# Patient Record
Sex: FEMALE | Race: WHITE | NOT HISPANIC OR LATINO
[De-identification: names, ages, dates, MRNs, and addresses within clinical notes are randomized per-mention and may not be internally consistent; named-entity substitution may affect disease eponyms.]

---

## 2021-02-02 ENCOUNTER — TRANSCRIPTION ENCOUNTER (OUTPATIENT)
Age: 66
End: 2021-02-02

## 2021-04-12 ENCOUNTER — OUTPATIENT (OUTPATIENT)
Dept: OUTPATIENT SERVICES | Facility: HOSPITAL | Age: 66
LOS: 1 days | End: 2021-04-12
Payer: MEDICARE

## 2021-04-12 DIAGNOSIS — R68.81 EARLY SATIETY: ICD-10-CM

## 2021-04-12 PROCEDURE — 74240 X-RAY XM UPR GI TRC 1CNTRST: CPT

## 2021-04-12 PROCEDURE — 74240 X-RAY XM UPR GI TRC 1CNTRST: CPT | Mod: 26

## 2021-04-12 PROCEDURE — 74220 X-RAY XM ESOPHAGUS 1CNTRST: CPT

## 2021-04-15 PROBLEM — Z00.00 ENCOUNTER FOR PREVENTIVE HEALTH EXAMINATION: Status: ACTIVE | Noted: 2021-04-15

## 2021-04-16 ENCOUNTER — OUTPATIENT (OUTPATIENT)
Dept: OUTPATIENT SERVICES | Facility: HOSPITAL | Age: 66
LOS: 1 days | End: 2021-04-16
Payer: MEDICARE

## 2021-04-16 ENCOUNTER — APPOINTMENT (OUTPATIENT)
Dept: CT IMAGING | Facility: CLINIC | Age: 66
End: 2021-04-16
Payer: MEDICARE

## 2021-04-16 DIAGNOSIS — R68.81 EARLY SATIETY: ICD-10-CM

## 2021-04-16 DIAGNOSIS — R10.13 EPIGASTRIC PAIN: ICD-10-CM

## 2021-04-16 DIAGNOSIS — R14.0 ABDOMINAL DISTENSION (GASEOUS): ICD-10-CM

## 2021-04-16 PROCEDURE — 82565 ASSAY OF CREATININE: CPT

## 2021-04-16 PROCEDURE — 74177 CT ABD & PELVIS W/CONTRAST: CPT | Mod: 26,MH

## 2021-04-16 PROCEDURE — 74177 CT ABD & PELVIS W/CONTRAST: CPT

## 2021-04-20 ENCOUNTER — RESULT REVIEW (OUTPATIENT)
Age: 66
End: 2021-04-20

## 2021-04-22 ENCOUNTER — APPOINTMENT (OUTPATIENT)
Dept: ENDOCRINOLOGY | Facility: CLINIC | Age: 66
End: 2021-04-22
Payer: MEDICARE

## 2021-04-22 VITALS
RESPIRATION RATE: 16 BRPM | SYSTOLIC BLOOD PRESSURE: 127 MMHG | TEMPERATURE: 98.1 F | HEART RATE: 73 BPM | OXYGEN SATURATION: 97 % | WEIGHT: 190 LBS | HEIGHT: 62 IN | BODY MASS INDEX: 34.96 KG/M2 | DIASTOLIC BLOOD PRESSURE: 72 MMHG

## 2021-04-22 DIAGNOSIS — Z78.9 OTHER SPECIFIED HEALTH STATUS: ICD-10-CM

## 2021-04-22 PROCEDURE — 99214 OFFICE O/P EST MOD 30 MIN: CPT

## 2021-04-25 PROBLEM — Z78.9 CURRENT NON-SMOKER: Status: ACTIVE | Noted: 2021-04-25

## 2021-04-25 PROBLEM — Z78.9 CURRENT NON-DRINKER OF ALCOHOL: Status: ACTIVE | Noted: 2021-04-25

## 2021-04-25 RX ORDER — CYCLOBENZAPRINE HYDROCHLORIDE 10 MG/1
10 TABLET, FILM COATED ORAL
Qty: 30 | Refills: 0 | Status: DISCONTINUED | COMMUNITY
Start: 2021-01-03

## 2021-04-25 RX ORDER — MUPIROCIN 20 MG/G
2 OINTMENT TOPICAL
Qty: 22 | Refills: 0 | Status: DISCONTINUED | COMMUNITY
Start: 2021-03-05

## 2021-04-25 RX ORDER — OMEPRAZOLE 20 MG/1
20 CAPSULE, DELAYED RELEASE ORAL
Qty: 90 | Refills: 0 | Status: DISCONTINUED | COMMUNITY
Start: 2021-02-27

## 2021-04-25 RX ORDER — CLOBETASOL PROPIONATE 0.5 MG/G
0.05 CREAM TOPICAL
Qty: 15 | Refills: 0 | Status: DISCONTINUED | COMMUNITY
Start: 2020-08-20

## 2021-04-25 RX ORDER — CHLORHEXIDINE GLUCONATE, 0.12% ORAL RINSE 1.2 MG/ML
0.12 SOLUTION DENTAL
Qty: 473 | Refills: 0 | Status: DISCONTINUED | COMMUNITY
Start: 2021-03-03

## 2021-04-25 RX ORDER — IBUPROFEN 800 MG/1
800 TABLET, FILM COATED ORAL
Qty: 15 | Refills: 0 | Status: DISCONTINUED | COMMUNITY
Start: 2021-03-11

## 2021-04-25 RX ORDER — OMEPRAZOLE 40 MG/1
40 CAPSULE, DELAYED RELEASE ORAL
Qty: 60 | Refills: 0 | Status: ACTIVE | COMMUNITY
Start: 2021-04-20

## 2021-04-25 RX ORDER — AMOXICILLIN 500 MG/1
500 CAPSULE ORAL
Qty: 21 | Refills: 0 | Status: DISCONTINUED | COMMUNITY
Start: 2021-03-11

## 2021-04-25 NOTE — PHYSICAL EXAM
[Alert] : alert [No Acute Distress] : no acute distress [Normal Sclera/Conjunctiva] : normal sclera/conjunctiva [No Proptosis] : no proptosis [Normal Outer Ear/Nose] : the ears and nose were normal in appearance [Normal Hearing] : hearing was normal [No Respiratory Distress] : no respiratory distress [Clear to Auscultation] : lungs were clear to auscultation bilaterally [Normal Bowel Sounds] : normal bowel sounds [Soft] : abdomen soft [Normal Gait] : normal gait [No Joint Swelling] : no joint swelling seen [No Rash] : no rash [Cranial Nerves Intact] : cranial nerves 2-12 were intact [No Tremors] : no tremors [Oriented x3] : oriented to person, place, and time [Normal Affect] : the affect was normal [Normal Mood] : the mood was normal [Foot Ulcers] : no foot ulcers

## 2021-04-25 NOTE — REVIEW OF SYSTEMS
[All other systems negative] : All other systems negative [Fatigue] : no fatigue [Decreased Appetite] : appetite not decreased [Visual Field Defect] : no visual field defect [Dysphonia] : no dysphonia [Chest Pain] : no chest pain [Palpitations] : no palpitations [Shortness Of Breath] : no shortness of breath [Nausea] : no nausea [Vomiting] : no vomiting [Joint Pain] : no joint pain [Muscle Weakness] : no muscle weakness [Acne] : no acne [Headaches] : no headaches [Tremors] : no tremors [Depression] : no depression [Cold Intolerance] : no cold intolerance [Heat Intolerance] : no heat intolerance [Easy Bleeding] : no ~M tendency for easy bleeding [Easy Bruising] : no tendency for easy bruising

## 2021-04-25 NOTE — ASSESSMENT
[FreeTextEntry1] : 65 yr old F with HTN, hiatal hernia here with incidental finding of R adrenal mass\par R adrenal mass:\par Will refer for MRI adrenal glands to better characterize benign vs malignant characteristics of lesion\par Check Plasma Renin and Serum Aldosterone levels\par Check Plasma metanephrines and DHEAS\par Will perform 1 mg dexamethasone test (instructed pt on proper timings)\par \par Obesity:\par Counselled on diet and exercise modification and importance of weight loss\par \par \par

## 2021-05-06 LAB
ACTH SER-ACNC: <1.5 PG/ML
ALBUMIN SERPL ELPH-MCNC: 4.4 G/DL
ALDOSTERONE SERUM: 7 NG/DL
ALP BLD-CCNC: 111 U/L
ALT SERPL-CCNC: 36 U/L
ANION GAP SERPL CALC-SCNC: 13 MMOL/L
AST SERPL-CCNC: 20 U/L
BASOPHILS # BLD AUTO: 0.03 K/UL
BASOPHILS NFR BLD AUTO: 0.4 %
BILIRUB SERPL-MCNC: 0.6 MG/DL
BUN SERPL-MCNC: 16 MG/DL
CALCIUM SERPL-MCNC: 9.7 MG/DL
CHLORIDE SERPL-SCNC: 106 MMOL/L
CO2 SERPL-SCNC: 23 MMOL/L
CORTIS SERPL-MCNC: 2.3 UG/DL
CREAT SERPL-MCNC: 0.71 MG/DL
DEXAMETHASONE LEVEL: NORMAL
DHEA-SULFATE, SERUM: 25 UG/DL
EOSINOPHIL # BLD AUTO: 0.01 K/UL
EOSINOPHIL NFR BLD AUTO: 0.1 %
GLUCOSE SERPL-MCNC: 147 MG/DL
HCT VFR BLD CALC: 45.6 %
HGB BLD-MCNC: 14.6 G/DL
IMM GRANULOCYTES NFR BLD AUTO: 0.3 %
LYMPHOCYTES # BLD AUTO: 1.16 K/UL
LYMPHOCYTES NFR BLD AUTO: 16.2 %
MAN DIFF?: NORMAL
MCHC RBC-ENTMCNC: 27.4 PG
MCHC RBC-ENTMCNC: 32 GM/DL
MCV RBC AUTO: 85.7 FL
METANEPHRINE, PL: 25.4 PG/ML
MONOCYTES # BLD AUTO: 0.18 K/UL
MONOCYTES NFR BLD AUTO: 2.5 %
NEUTROPHILS # BLD AUTO: 5.75 K/UL
NEUTROPHILS NFR BLD AUTO: 80.5 %
NORMETANEPHRINE, PL: 102.9 PG/ML
PLATELET # BLD AUTO: 313 K/UL
POTASSIUM SERPL-SCNC: 4.2 MMOL/L
PROT SERPL-MCNC: 7.1 G/DL
RBC # BLD: 5.32 M/UL
RBC # FLD: 14.2 %
RENIN ACTIVITY, PLASMA: 1.92 NG/ML/HR
SODIUM SERPL-SCNC: 142 MMOL/L
T4 FREE SERPL-MCNC: 1.3 NG/DL
TESTOST BND SERPL-MCNC: 0.3 PG/ML
TESTOST SERPL-MCNC: 7.5 NG/DL
TSH SERPL-ACNC: 1.2 UIU/ML
WBC # FLD AUTO: 7.15 K/UL

## 2021-05-11 ENCOUNTER — APPOINTMENT (OUTPATIENT)
Dept: MRI IMAGING | Facility: CLINIC | Age: 66
End: 2021-05-11
Payer: MEDICARE

## 2021-05-11 ENCOUNTER — OUTPATIENT (OUTPATIENT)
Dept: OUTPATIENT SERVICES | Facility: HOSPITAL | Age: 66
LOS: 1 days | End: 2021-05-11
Payer: MEDICARE

## 2021-05-11 DIAGNOSIS — D35.00 BENIGN NEOPLASM OF UNSPECIFIED ADRENAL GLAND: ICD-10-CM

## 2021-05-11 PROCEDURE — A9585: CPT

## 2021-05-11 PROCEDURE — G1004: CPT

## 2021-05-11 PROCEDURE — 74183 MRI ABD W/O CNTR FLWD CNTR: CPT | Mod: 26,MF

## 2021-05-11 PROCEDURE — 74183 MRI ABD W/O CNTR FLWD CNTR: CPT

## 2021-05-24 ENCOUNTER — APPOINTMENT (OUTPATIENT)
Dept: UROLOGY | Facility: CLINIC | Age: 66
End: 2021-05-24
Payer: MEDICARE

## 2021-05-24 VITALS
HEIGHT: 62 IN | WEIGHT: 195 LBS | DIASTOLIC BLOOD PRESSURE: 83 MMHG | HEART RATE: 86 BPM | BODY MASS INDEX: 35.88 KG/M2 | TEMPERATURE: 97.2 F | RESPIRATION RATE: 16 BRPM | SYSTOLIC BLOOD PRESSURE: 131 MMHG

## 2021-05-24 DIAGNOSIS — Z80.1 FAMILY HISTORY OF MALIGNANT NEOPLASM OF TRACHEA, BRONCHUS AND LUNG: ICD-10-CM

## 2021-05-24 DIAGNOSIS — E27.8 OTHER SPECIFIED DISORDERS OF ADRENAL GLAND: ICD-10-CM

## 2021-05-24 DIAGNOSIS — Z80.0 FAMILY HISTORY OF MALIGNANT NEOPLASM OF DIGESTIVE ORGANS: ICD-10-CM

## 2021-05-24 DIAGNOSIS — Z80.3 FAMILY HISTORY OF MALIGNANT NEOPLASM OF BREAST: ICD-10-CM

## 2021-05-24 PROCEDURE — 99204 OFFICE O/P NEW MOD 45 MIN: CPT

## 2021-05-24 RX ORDER — SUCRALFATE 1 G/1
1 TABLET ORAL
Qty: 60 | Refills: 0 | Status: ACTIVE | COMMUNITY
Start: 2021-04-21

## 2021-05-24 NOTE — LETTER BODY
[Dear  ___] : Dear  [unfilled], [FreeTextEntry1] : I had the pleasure of seeing your patient, ERNST RAE, in the office today.  \par \par Please see my office note below.\par \par Thank you for allowing me to participate in his care and please do not hesitate to contact me with any questions or concerns regarding her care.\par \par Sincerely,\par \par Roldan Alfonso MD\par Chief of Urology, [facility]\par  of Urology\par [address] [place] [address], [place] [address]\[place] [address]\par PH:      [phone]\par Email:  [email or web address]

## 2021-05-24 NOTE — HISTORY OF PRESENT ILLNESS
[FreeTextEntry1] : The patient was referred for evaluation of right adrenal mass.\par She initially presented with upper GI symptoms and was started on treatment for GERD/gastritis.  During evaluation she underwent imaging which revealed incidental finding of a right adrenal lesion.  She was seen by endocrinology and underwent a full work-up which revealed subclinical hypercortisolism.\par \par CT abdomen/pelvis: Images reviewed, findings revealed 3.9 x 3.7 cm atypical adrenal mass with eccentric calcification.  Follow-up MRI abdomen showed findings consistent with likely atypical adenoma.\par \par She denies a history of palpitations, diaphoresis, headaches, episodic hypertension, hypokalemia.  She denies easy bruising, mood changes, proximal muscle weakness.  Her GI symptoms have resolved with improved medication including omeprazole and sucralfate.\par \par

## 2021-05-24 NOTE — ASSESSMENT
[FreeTextEntry1] : CT/MRI imaging reviewed.  Adrenal work-up from endocrinology was reviewed.  Agree with diagnosis of likely benign atypical adrenal adenoma with subclinical hypersecretion of cortisol.\par \par Recommend to proceed with minimally invasive laparoscopic right adrenalectomy.  Reviewed operative procedure, hospital stay and recovery time.  Risks of surgery discussed including risks of bleeding (both immediate and delayed), wound/abdominal infection, adjacent organ injury (bowel/vascular), conversion to open surgery,  DVT/PE, and anesthetic complications.  Discussed potential changes in hormonal function post adrenalectomy.  Discussed the likelihood of both malignant and benign pathology.  \par \par All questions answered.  Patient was seen by another urologist who recommended a robotic right adrenalectomy.  Patient will consider both recommendations and will call the office at the end of the week with final decision.\par

## 2021-05-29 ENCOUNTER — TRANSCRIPTION ENCOUNTER (OUTPATIENT)
Age: 66
End: 2021-05-29

## 2021-06-01 LAB
CORTIS 24H UR-MCNC: 24 H
CORTIS 24H UR-MRATE: 49 MCG/24 H
CORTIS SAL-MCNC: NORMAL
SPECIMEN VOL 24H UR: 1575 ML

## 2021-06-02 ENCOUNTER — NON-APPOINTMENT (OUTPATIENT)
Age: 66
End: 2021-06-02

## 2021-06-11 ENCOUNTER — OUTPATIENT (OUTPATIENT)
Dept: OUTPATIENT SERVICES | Facility: HOSPITAL | Age: 66
LOS: 1 days | End: 2021-06-11
Payer: MEDICARE

## 2021-06-11 VITALS
HEIGHT: 62 IN | RESPIRATION RATE: 18 BRPM | OXYGEN SATURATION: 97 % | WEIGHT: 203.93 LBS | TEMPERATURE: 98 F | DIASTOLIC BLOOD PRESSURE: 80 MMHG | SYSTOLIC BLOOD PRESSURE: 130 MMHG | HEART RATE: 78 BPM

## 2021-06-11 DIAGNOSIS — E27.8 OTHER SPECIFIED DISORDERS OF ADRENAL GLAND: ICD-10-CM

## 2021-06-11 DIAGNOSIS — R06.83 SNORING: ICD-10-CM

## 2021-06-11 DIAGNOSIS — Z98.890 OTHER SPECIFIED POSTPROCEDURAL STATES: Chronic | ICD-10-CM

## 2021-06-11 DIAGNOSIS — I10 ESSENTIAL (PRIMARY) HYPERTENSION: ICD-10-CM

## 2021-06-11 DIAGNOSIS — Z98.49 CATARACT EXTRACTION STATUS, UNSPECIFIED EYE: Chronic | ICD-10-CM

## 2021-06-11 DIAGNOSIS — Z90.49 ACQUIRED ABSENCE OF OTHER SPECIFIED PARTS OF DIGESTIVE TRACT: Chronic | ICD-10-CM

## 2021-06-11 LAB
BLD GP AB SCN SERPL QL: NEGATIVE — SIGNIFICANT CHANGE UP
RH IG SCN BLD-IMP: POSITIVE — SIGNIFICANT CHANGE UP

## 2021-06-11 PROCEDURE — 93010 ELECTROCARDIOGRAM REPORT: CPT

## 2021-06-11 RX ORDER — SODIUM CHLORIDE 9 MG/ML
3 INJECTION INTRAMUSCULAR; INTRAVENOUS; SUBCUTANEOUS EVERY 8 HOURS
Refills: 0 | Status: DISCONTINUED | OUTPATIENT
Start: 2021-06-18 | End: 2021-06-21

## 2021-06-11 RX ORDER — SODIUM CHLORIDE 9 MG/ML
1000 INJECTION, SOLUTION INTRAVENOUS
Refills: 0 | Status: DISCONTINUED | OUTPATIENT
Start: 2021-06-18 | End: 2021-06-18

## 2021-06-11 NOTE — H&P PST ADULT - NSICDXFAMILYHX_GEN_ALL_CORE_FT
FAMILY HISTORY:  Mother  Still living? Unknown  FH: breast cancer, Age at diagnosis: Age Unknown  FH: colon cancer, Age at diagnosis: Age Unknown  FH: lung cancer, Age at diagnosis: Age Unknown  FH: ovarian cancer, Age at diagnosis: Age Unknown    Sibling  Still living? Unknown  FH: breast cancer, Age at diagnosis: Age Unknown

## 2021-06-11 NOTE — H&P PST ADULT - NSICDXPASTSURGICALHX_GEN_ALL_CORE_FT
PAST SURGICAL HISTORY:  H/O breast biopsy     H/O excision of mass benign mass from forehead    S/P anal fissurectomy     S/P cataract surgery     S/P cholecystectomy

## 2021-06-11 NOTE — H&P PST ADULT - NSICDXPROBLEM_GEN_ALL_CORE_FT
PROBLEM DIAGNOSES  Problem: Other specified disorders of adrenal gland  Assessment and Plan: Pt scheduled for surgery on 6/25/21.  Pre-op instructions provided. Pt verbalized understanding.   Pt to take own medication for GI ppx.   Pt given detailed verbal and written instructions on chlorhexidine wash. Pt verbalized understanding with teachback.  Endocrine recommendations in chart.   Pt is going for medical clearance per surgeon's request - requesting copy (due to age/surgery)    Problem: Snoring  Assessment and Plan: POLLY precautions. Pt with >3 criteria on STOP-Bang Questionairre. OR booking notified. htn      Problem: HTN (hypertension)  Assessment and Plan: Pt instructed to take her Diltiazem the morning of surgery.

## 2021-06-11 NOTE — H&P PST ADULT - GIT GEN HX ROS MEA POS PC
occasional symptoms  - undergoing GI workup - going for endoscopy next week/nausea/diarrhea/abdominal pain

## 2021-06-11 NOTE — H&P PST ADULT - HISTORY OF PRESENT ILLNESS
66 year old female with finding of adrenal mass on workup for GI symptom a few weeks ago.  66 year old female with finding of adrenal mass on workup for GI symptoms done a few weeks ago. Pt presents today for presurgical evaluation for ... 66 year old female with finding of adrenal mass on workup for GI symptoms done a few weeks ago. Pt presents today for presurgical evaluation for Right Laparoscopic Adrenalectomy.

## 2021-06-11 NOTE — H&P PST ADULT - NEGATIVE ENMT SYMPTOMS
no ear pain/no tinnitus/no vertigo/no sinus symptoms/no nasal congestion/no throat pain/no dysphagia

## 2021-06-11 NOTE — H&P PST ADULT - NSANTHTOTALSCORECAL_ENT_A_CORE
Spoke with patient today in regard to smoking cessation progress for 3/6 month telephone follow up, he states not tobacco free. Patient has scheduled an appointment to return to the program for Quit attempt #4 for support on 7/24/2020 @ 10:00 am via telephone due to COVID-19 pandemic. Informed patient of benefit period,future follow ups, and contact information if any further help or support is needed. Will resolve episode and complete smart form for Quit attempt #2 and #3.      
6

## 2021-06-17 ENCOUNTER — TRANSCRIPTION ENCOUNTER (OUTPATIENT)
Age: 66
End: 2021-06-17

## 2021-06-17 NOTE — ASU PATIENT PROFILE, ADULT - PSH
H/O breast biopsy    H/O excision of mass  benign mass from forehead  S/P anal fissurectomy    S/P cataract surgery    S/P cholecystectomy

## 2021-06-18 ENCOUNTER — RESULT REVIEW (OUTPATIENT)
Age: 66
End: 2021-06-18

## 2021-06-18 ENCOUNTER — APPOINTMENT (OUTPATIENT)
Dept: UROLOGY | Facility: HOSPITAL | Age: 66
End: 2021-06-18

## 2021-06-18 ENCOUNTER — INPATIENT (INPATIENT)
Facility: HOSPITAL | Age: 66
LOS: 2 days | Discharge: HOME | End: 2021-06-21
Attending: UROLOGY | Admitting: UROLOGY
Payer: MEDICARE

## 2021-06-18 VITALS
DIASTOLIC BLOOD PRESSURE: 74 MMHG | RESPIRATION RATE: 18 BRPM | WEIGHT: 199.96 LBS | HEIGHT: 62 IN | TEMPERATURE: 98 F | OXYGEN SATURATION: 95 % | HEART RATE: 88 BPM | SYSTOLIC BLOOD PRESSURE: 138 MMHG

## 2021-06-18 DIAGNOSIS — E66.9 OBESITY, UNSPECIFIED: ICD-10-CM

## 2021-06-18 DIAGNOSIS — Z90.49 ACQUIRED ABSENCE OF OTHER SPECIFIED PARTS OF DIGESTIVE TRACT: Chronic | ICD-10-CM

## 2021-06-18 DIAGNOSIS — I10 ESSENTIAL (PRIMARY) HYPERTENSION: ICD-10-CM

## 2021-06-18 DIAGNOSIS — E27.8 OTHER SPECIFIED DISORDERS OF ADRENAL GLAND: ICD-10-CM

## 2021-06-18 DIAGNOSIS — Z98.890 OTHER SPECIFIED POSTPROCEDURAL STATES: Chronic | ICD-10-CM

## 2021-06-18 DIAGNOSIS — Z98.49 CATARACT EXTRACTION STATUS, UNSPECIFIED EYE: Chronic | ICD-10-CM

## 2021-06-18 DIAGNOSIS — E24.0 PITUITARY-DEPENDENT CUSHING'S DISEASE: ICD-10-CM

## 2021-06-18 PROCEDURE — 88307 TISSUE EXAM BY PATHOLOGIST: CPT | Mod: 26

## 2021-06-18 PROCEDURE — 60650 LAPAROSCOPY ADRENALECTOMY: CPT | Mod: RT

## 2021-06-18 PROCEDURE — 88360 TUMOR IMMUNOHISTOCHEM/MANUAL: CPT | Mod: 26

## 2021-06-18 RX ORDER — SODIUM CHLORIDE 9 MG/ML
1000 INJECTION, SOLUTION INTRAVENOUS
Refills: 0 | Status: DISCONTINUED | OUTPATIENT
Start: 2021-06-18 | End: 2021-06-18

## 2021-06-18 RX ORDER — POLYETHYLENE GLYCOL 3350 17 G/17G
17 POWDER, FOR SOLUTION ORAL DAILY
Refills: 0 | Status: DISCONTINUED | OUTPATIENT
Start: 2021-06-18 | End: 2021-06-21

## 2021-06-18 RX ORDER — HYDROCORTISONE 20 MG
50 TABLET ORAL EVERY 8 HOURS
Refills: 0 | Status: DISCONTINUED | OUTPATIENT
Start: 2021-06-18 | End: 2021-06-19

## 2021-06-18 RX ORDER — ONDANSETRON 8 MG/1
4 TABLET, FILM COATED ORAL ONCE
Refills: 0 | Status: COMPLETED | OUTPATIENT
Start: 2021-06-18 | End: 2021-06-18

## 2021-06-18 RX ORDER — KETOROLAC TROMETHAMINE 30 MG/ML
15 SYRINGE (ML) INJECTION EVERY 6 HOURS
Refills: 0 | Status: DISCONTINUED | OUTPATIENT
Start: 2021-06-18 | End: 2021-06-19

## 2021-06-18 RX ORDER — OXYCODONE HYDROCHLORIDE 5 MG/1
5 TABLET ORAL EVERY 6 HOURS
Refills: 0 | Status: DISCONTINUED | OUTPATIENT
Start: 2021-06-18 | End: 2021-06-21

## 2021-06-18 RX ORDER — HEPARIN SODIUM 5000 [USP'U]/ML
5000 INJECTION INTRAVENOUS; SUBCUTANEOUS EVERY 8 HOURS
Refills: 0 | Status: DISCONTINUED | OUTPATIENT
Start: 2021-06-18 | End: 2021-06-21

## 2021-06-18 RX ORDER — HYDROMORPHONE HYDROCHLORIDE 2 MG/ML
0.5 INJECTION INTRAMUSCULAR; INTRAVENOUS; SUBCUTANEOUS
Refills: 0 | Status: DISCONTINUED | OUTPATIENT
Start: 2021-06-18 | End: 2021-06-18

## 2021-06-18 RX ORDER — PANTOPRAZOLE SODIUM 20 MG/1
40 TABLET, DELAYED RELEASE ORAL
Refills: 0 | Status: DISCONTINUED | OUTPATIENT
Start: 2021-06-18 | End: 2021-06-21

## 2021-06-18 RX ORDER — OXYCODONE HYDROCHLORIDE 5 MG/1
10 TABLET ORAL EVERY 6 HOURS
Refills: 0 | Status: DISCONTINUED | OUTPATIENT
Start: 2021-06-18 | End: 2021-06-21

## 2021-06-18 RX ORDER — ONDANSETRON 8 MG/1
4 TABLET, FILM COATED ORAL EVERY 6 HOURS
Refills: 0 | Status: DISCONTINUED | OUTPATIENT
Start: 2021-06-18 | End: 2021-06-21

## 2021-06-18 RX ORDER — SENNA PLUS 8.6 MG/1
2 TABLET ORAL AT BEDTIME
Refills: 0 | Status: DISCONTINUED | OUTPATIENT
Start: 2021-06-18 | End: 2021-06-21

## 2021-06-18 RX ORDER — HYDROMORPHONE HYDROCHLORIDE 2 MG/ML
1 INJECTION INTRAMUSCULAR; INTRAVENOUS; SUBCUTANEOUS
Refills: 0 | Status: DISCONTINUED | OUTPATIENT
Start: 2021-06-18 | End: 2021-06-18

## 2021-06-18 RX ORDER — ACETAMINOPHEN 500 MG
1000 TABLET ORAL EVERY 6 HOURS
Refills: 0 | Status: COMPLETED | OUTPATIENT
Start: 2021-06-18 | End: 2021-06-19

## 2021-06-18 RX ADMIN — Medication 400 MILLIGRAM(S): at 21:33

## 2021-06-18 RX ADMIN — HEPARIN SODIUM 5000 UNIT(S): 5000 INJECTION INTRAVENOUS; SUBCUTANEOUS at 21:56

## 2021-06-18 RX ADMIN — Medication 15 MILLIGRAM(S): at 18:28

## 2021-06-18 RX ADMIN — SODIUM CHLORIDE 3 MILLILITER(S): 9 INJECTION INTRAMUSCULAR; INTRAVENOUS; SUBCUTANEOUS at 21:57

## 2021-06-18 RX ADMIN — Medication 15 MILLIGRAM(S): at 18:45

## 2021-06-18 RX ADMIN — ONDANSETRON 4 MILLIGRAM(S): 8 TABLET, FILM COATED ORAL at 18:28

## 2021-06-18 RX ADMIN — Medication 50 MILLIGRAM(S): at 21:57

## 2021-06-18 RX ADMIN — Medication 15 MILLIGRAM(S): at 23:37

## 2021-06-18 NOTE — CONSULT NOTE ADULT - ASSESSMENT
67 yo female with PMhx of HTN and Cushing's and R adrenal Mass who presented for scheduled R adrenalectomy.    Problem 1: Cushing's s/p R adrenalectomy  - Start 50mg IV hydrocortisone q8hrs. Will taper course while patient is in the hospital pending BP trend.   - Monitor BP  - Check HbA1c  - Patient should f/u with Outpatient Endocrinologist Dr. Quintana.     Problem 2: R adrenal Mass s/p R adrenalectomy  - Recommendations as noted above  - F/u pathology    Problem 3: Hypertension  - Hold home BP medication at this time    Problem 4: Obesity  - Encourage lifestyle modifications  - Check HbA1c as noted above    Discussed with Primary Team    Kesha Lucia M.D  Endocrine Fellow  Pager #[phone] 65 yo female with PMhx of HTN and Cushing's and R adrenal Mass who presented for scheduled R adrenalectomy.    Problem 1: Cushing's s/p R adrenalectomy  Elevated outpatient serum cortisol 2.3 after dex suppression test as well as elevated urine and salivary cortisol. ACTH levels <1.5.   - Start 50mg IV hydrocortisone q8hrs. Will taper course while patient is in the hospital pending BP trend.   - Monitor BP  - Check HbA1c  - Patient should f/u with Outpatient Endocrinologist Dr. Quintana.     Problem 2: R adrenal Mass s/p R adrenalectomy  MRI completed in 05/2021 which showed a 4.0 X 3.6cm R adrenal lesion with atypical features.   - Recommendations as noted above  - F/u pathology    Problem 3: Hypertension  - Hold home BP medication at this time    Problem 4: Obesity  - Encourage lifestyle modifications  - Check HbA1c as noted above    Discussed with Primary Team    Kesha Lucia M.D  Endocrine Fellow  Pager #[phone]

## 2021-06-18 NOTE — CONSULT NOTE ADULT - SUBJECTIVE AND OBJECTIVE BOX
HPI:  67 yo female with PMhx of HTN and Cushing's and R adrenal Mass who presented for scheduled R adrenalectomy.    Endocrine History:  Patient was initially referred to Endocrinologist Dr. Quintana in 04/2021 after she was noted to have R adrenal mass on CT scan measuring 3.9 X 3.7 cm.  Patient subsequently had an MRI completed in 05/2021 which showed a 4.0 X 3.6cm R adrenal lesion with atypical features.   Hormonal workup revealed elevated serum cortisol 2.3 after dex suppression test as well as elevated urine and salivary cortisol. ACTH levels were low <1.5.   Serum renin, aldosterone, and metanephrine were wnl.     Patient has a h/o HTN well controlled on one agent.  No h/o episodic headaches, diaphoresis. Patient dis report intermittent palpitations.  No h/o easy bruising, skin changes, proximal muscle weakness, mood changes, hair growth.  Patient noted to have weight gain     Patient was subsequently referred to urology who recommended R adrenalectomy.     Interval History:  Patient seen prior to surgery today. ROS as noted below.  Per primary team patient received 100mg IV hydrocortisone intra-op.    PAST MEDICAL & SURGICAL HISTORY:  HTN (hypertension)  Obesity  S/P cholecystectomy  S/P cataract surgery  H/O excision of mass  benign mass from forehead  H/O breast biopsy  S/P anal fissurectomy    FAMILY HISTORY:  FH: breast cancer (Sibling, Mother)  FH: ovarian cancer (Mother)  FH: colon cancer (Mother)  FH: lung cancer (Mother)    Social History:  No tobacco, EtOH, or illicit drug use    Outpatient Medications:  dilTIAZem 120 mg/24 hours oral capsule, extended release: 1 cap(s) orally 2 times a day  Fish Oil oral capsule: 1 cap(s) orally once a day - last dose 6/17/21  Multivitamin: 1 tab(s) orally once a day - last dose 6/17/21  omeprazole 40 mg oral delayed release capsule: 1 cap(s) orally once a day  Osteo Bi-Flex 250 mg-200 mg oral tablet: 1 tab(s) orally once a day, last dose 6/17/2021  Vitamin C 500 mg oral capsule: 2 cap(s) orally once a day  Vitamin D3 1000 intl units (25 mcg) oral tablet: 1 tab(s) orally once a day  Zinc 140 mg (as elemental zinc 50 mg) oral tablet: 1 tab(s) orally once a day    MEDICATIONS  (STANDING):  lactated ringers. 1000 milliLiter(s) (125 mL/Hr) IV Continuous <Continuous>  sodium chloride 0.9% lock flush 3 milliLiter(s) IV Push every 8 hours    MEDICATIONS  (PRN):  HYDROmorphone  Injectable 0.5 milliGRAM(s) IV Push every 10 minutes PRN Moderate Pain (4 - 6)  HYDROmorphone  Injectable 1 milliGRAM(s) IV Push every 10 minutes PRN Severe Pain (7 - 10)  ondansetron Injectable 4 milliGRAM(s) IV Push once PRN Nausea and/or Vomiting    Allergies  No Known Allergies    Review of Systems:  General: no fevers, chills, poor appetite, + weight gain  HEENT: no blurry vision, no headache  CV: no chest pain, no current palpitations  Pulm: no SOB, no cough  GI: no n/v/d, no abdominal pain  : no dysuria, no hematuria  Skin: no rash or ulcers  Endocrine: no polyuria, polydipsia  Neuro: no tremors  Psych: no depressed mood    Physical exam  VITALS: T(C): 36.5 (06-18-21 @ 17:35)  T(F): 97.7 (06-18-21 @ 17:35), Max: 98 (06-18-21 @ 10:59)  HR: 74 (06-18-21 @ 17:40) (72 - 88)  BP: 136/72 (06-18-21 @ 17:40) (136/72 - 141/67)  RR:  (16 - 18)  SpO2:  (95% - 97%)  Wt(kg): 90.7  General: NAD, well-appearing  Eyes: no proptosis, anicteric  HEENT: atraumatic, normocephalic, MMM  Thyroid: normal size, no palpable nodules  CV: normal rate, regular rhythm  Pulm: CTA in anterior lung fields  Abd: +BS, soft and non-tender to palpation  Ext: 2+ radial and dorsalis pedis pulse, no edema  Skin: no rash, ulcers  Neuro: A&Ox3, no gross deficits appreciated  Psych: reactive affect, euthymic mood                                         HPI:  65 yo female with PMhx of HTN and Cushing's and R adrenal Mass who presented for scheduled R adrenalectomy.    Endocrine History:  Patient was initially referred to Endocrinologist Dr. Quintana in 04/2021 after she was noted to have R adrenal mass on CT scan measuring 3.9 X 3.7 cm.  Patient subsequently had an MRI completed in 05/2021 which showed a 4.0 X 3.6cm R adrenal lesion with atypical features.   Hormonal workup revealed elevated serum cortisol 2.3 after dex suppression test as well as elevated urine and salivary cortisol. ACTH levels were low <1.5.   Serum renin, aldosterone, and metanephrine were wnl.     Patient has a h/o HTN well controlled on one agent.  No h/o episodic headaches, diaphoresis. Patient dis report intermittent palpitations.  No h/o easy bruising, skin changes, proximal muscle weakness, mood changes, hair growth.  Patient noted to have weight gain     Patient was subsequently referred to urology who recommended R adrenalectomy.     Interval History:  Patient seen prior to surgery today. ROS as noted below.  Per primary team patient received 100mg IV hydrocortisone intra-op.    PAST MEDICAL & SURGICAL HISTORY:  HTN (hypertension)  Obesity  S/P cholecystectomy  S/P cataract surgery  H/O excision of mass  benign mass from forehead  H/O breast biopsy  S/P anal fissurectomy    FAMILY HISTORY:  FH: breast cancer (Sibling, Mother)  FH: ovarian cancer (Mother)  FH: colon cancer (Mother)  FH: lung cancer (Mother)    Social History:  No tobacco, EtOH, or illicit drug use    Outpatient Medications:  dilTIAZem 120 mg/24 hours oral capsule, extended release: 1 cap(s) orally 2 times a day  Fish Oil oral capsule: 1 cap(s) orally once a day - last dose 6/17/21  Multivitamin: 1 tab(s) orally once a day - last dose 6/17/21  omeprazole 40 mg oral delayed release capsule: 1 cap(s) orally once a day  Osteo Bi-Flex 250 mg-200 mg oral tablet: 1 tab(s) orally once a day, last dose 6/17/2021  Vitamin C 500 mg oral capsule: 2 cap(s) orally once a day  Vitamin D3 1000 intl units (25 mcg) oral tablet: 1 tab(s) orally once a day  Zinc 140 mg (as elemental zinc 50 mg) oral tablet: 1 tab(s) orally once a day    MEDICATIONS  (STANDING):  lactated ringers. 1000 milliLiter(s) (125 mL/Hr) IV Continuous <Continuous>  sodium chloride 0.9% lock flush 3 milliLiter(s) IV Push every 8 hours    MEDICATIONS  (PRN):  HYDROmorphone  Injectable 0.5 milliGRAM(s) IV Push every 10 minutes PRN Moderate Pain (4 - 6)  HYDROmorphone  Injectable 1 milliGRAM(s) IV Push every 10 minutes PRN Severe Pain (7 - 10)  ondansetron Injectable 4 milliGRAM(s) IV Push once PRN Nausea and/or Vomiting    Allergies  No Known Allergies    Review of Systems:  General: no fevers, chills, poor appetite, + weight gain  HEENT: no blurry vision, no headache  CV: no chest pain, no current palpitations  Pulm: no SOB, no cough  GI: no n/v/d, no abdominal pain  : no dysuria, no hematuria  Skin: no rash or ulcers  Endocrine: no polyuria, polydipsia  Neuro: no tremors  Psych: no depressed mood    Physical exam  VITALS: T(C): 36.5 (06-18-21 @ 17:35)  T(F): 97.7 (06-18-21 @ 17:35), Max: 98 (06-18-21 @ 10:59)  HR: 74 (06-18-21 @ 17:40) (72 - 88)  BP: 136/72 (06-18-21 @ 17:40) (136/72 - 141/67)  RR:  (16 - 18)  SpO2:  (95% - 97%)  Wt(kg): 90.7  General: NAD, well-appearing  Eyes: no proptosis, anicteric  HEENT: atraumatic, normocephalic, MMM  Thyroid: normal size, no palpable nodules  CV: normal rate, regular rhythm, trace pitting edema   Pulm: CTA in anterior lung fields, no wheezes, rhonchi  Abd: +BS, soft and non-tender to palpation  Ext: warm, dry, 2+ radial pulse  Skin: no rash, ulcers  Neuro: A&Ox3, no gross deficits appreciated  Psych: reactive affect, euthymic mood                                         rehabilitation facility

## 2021-06-18 NOTE — ASU PREOP CHECKLIST - COMMENTS
patient took diltiazem, pepcid with sip of water this 7:30am patient took diltiazem, omerprazole with sip of water this 7:30am

## 2021-06-18 NOTE — CONSULT NOTE ADULT - PROBLEM SELECTOR PROBLEM 4
Class 2 obesity with body mass index (BMI) of 36.0 to 36.9 in adult, unspecified obesity type, unspecified whether serious comorbidity present

## 2021-06-18 NOTE — PATIENT PROFILE ADULT - DO YOU FEEL UNSAFE AT HOME, WORK, OR SCHOOL?
07/01/2020  Kandis Sosa is a 16 y.o., female.    Anesthesia Evaluation    I have reviewed the Patient Summary Reports.    I have reviewed the Nursing Notes.    I have reviewed the Medications.     Review of Systems  Anesthesia Hx:  No problems with previous Anesthesia    Social:  Non-Smoker    Cardiovascular:  Cardiovascular Normal     Pulmonary:  Pulmonary Normal    Renal/:  Renal/ Normal     Hepatic/GI:   GERD, well controlled Dyschezia  Abdominal Pain  Lactase Deficiency  Abdominal Pain  Constipation   Neurological:  Neurology Normal    Endocrine:  Endocrine Normal        Physical Exam  General:  Well nourished    Airway/Jaw/Neck:  Airway Findings: Mouth Opening: Normal Tongue: Normal  General Airway Assessment: Adult  Oropharynx Findings:  Mallampati: II  Jaw/Neck Findings:  Neck ROM: Normal ROM     Eyes/Ears/Nose:  Eyes/Ears/Nose Findings:    Dental:  Dental Findings:   Chest/Lungs:  Chest/Lungs Findings: Normal Respiratory Rate     Heart/Vascular:  Heart Findings: Rate: Normal  Rhythm: Regular Rhythm        Mental Status:  Mental Status Findings:  Cooperative, Alert and Oriented         Anesthesia Plan  Type of Anesthesia, risks & benefits discussed:  Anesthesia Type:  general  Patient's Preference:   Intra-op Monitoring Plan: standard ASA monitors  Intra-op Monitoring Plan Comments:   Post Op Pain Control Plan: multimodal analgesia  Post Op Pain Control Plan Comments:   Induction:   IV  Beta Blocker:  Patient is not currently on a Beta-Blocker (No further documentation required).       Informed Consent: Patient understands risks and agrees with Anesthesia plan.  Questions answered. Anesthesia consent signed with patient.  ASA Score: 2     Day of Surgery Review of History & Physical:            Ready For Surgery From Anesthesia Perspective.       
no

## 2021-06-18 NOTE — CONSULT NOTE ADULT - ATTENDING COMMENTS
Case discussed with Dr. Lucia on 6/18/2021.  Patient seen and evaluated 6/19/2021.  66F with 4cm R adrenal mass, biochemical diagnosis of Cushings syndrome with some symptoms now s/p R adrenalectomy yesterday.  Patient was initiated on stress dose hydrocortisone 50mg IV q8h post op.  She reports significant pain and only starting to tolerate liquids.  BP is stable off home antihypertensive.  Can decrease hydrocortisone to 50mg IV q12h today.  Check BMP for today as Na, K can fluctuate postop.  Counselled on discharge plan which will require hydrocortisone replacement at home.  Reviewed sick day management.  Patient does not appear ready for discharge today will follow up tomorrow.  Outpatient endocrine follow up with Dr. Quintana for steroid taper. Follow up surgical pathology.  Complex patient high level decision making.    Adia Conroy MD  Division of Endocrinology  Pager: [phone]    If after 6PM or before 9AM, or on weekends/holidays, please call endocrine answering service for assistance ([phone]).  For nonurgent matters email [email or web address] for assistance.

## 2021-06-18 NOTE — PROGRESS NOTE ADULT - PROBLEM SELECTOR PLAN 1
Strict I&O's  Analgesia Antiemetics  DVT prophylaxis  Incentive spirometry  hydrocortisone as per Endo consult  Clears / OOB  AM labs

## 2021-06-18 NOTE — PROGRESS NOTE ADULT - SUBJECTIVE AND OBJECTIVE BOX
Note    Post op Check    s/p : R lap adrenalectomy    Pt seen / examined c/o R sided mickey incisional  pain and nausea    Vital Signs Last 24 Hrs  T(C): 36.5 (18 Jun 2021 21:09), Max: 36.7 (18 Jun 2021 10:59)  T(F): 97.7 (18 Jun 2021 21:09), Max: 98 (18 Jun 2021 10:59)  HR: 85 (18 Jun 2021 21:09) (68 - 88)  BP: 112/54 (18 Jun 2021 21:09) (112/54 - 145/74)  BP(mean): 84 (18 Jun 2021 19:15) (84 - 92)  RR: 14 (18 Jun 2021 21:09) (13 - 18)  SpO2: 96% (18 Jun 2021 21:09) (92% - 98%)    I&O's Summary    18 Jun 2021 07:01  -  18 Jun 2021 22:01  --------------------------------------------------------  IN: 250 mL / OUT: 175 mL / NET: 75 mL    EBL=200cc  Fol=275cc      PHYSICAL EXAM:       Constitutional: awake alert NAD    Respiratory: no resp distress    Cardiovascular: RR    Gastrointestinal: Softly distended, trocar sites CDI, appropriately tender    Genitourinary: lopez in place; draining well; dark yellow    Extremities: + venodynes

## 2021-06-19 LAB
A1C WITH ESTIMATED AVERAGE GLUCOSE RESULT: 5.5 % — SIGNIFICANT CHANGE UP (ref 4–5.6)
ANION GAP SERPL CALC-SCNC: 14 MMOL/L — SIGNIFICANT CHANGE UP (ref 7–14)
BUN SERPL-MCNC: 17 MG/DL — SIGNIFICANT CHANGE UP (ref 7–23)
CALCIUM SERPL-MCNC: 8.3 MG/DL — LOW (ref 8.4–10.5)
CHLORIDE SERPL-SCNC: 101 MMOL/L — SIGNIFICANT CHANGE UP (ref 98–107)
CO2 SERPL-SCNC: 21 MMOL/L — LOW (ref 22–31)
COVID-19 SPIKE DOMAIN AB INTERP: POSITIVE
COVID-19 SPIKE DOMAIN ANTIBODY RESULT: >250 U/ML — HIGH
CREAT SERPL-MCNC: 0.78 MG/DL — SIGNIFICANT CHANGE UP (ref 0.5–1.3)
ESTIMATED AVERAGE GLUCOSE: 111 MG/DL — SIGNIFICANT CHANGE UP (ref 68–114)
GLUCOSE SERPL-MCNC: 154 MG/DL — HIGH (ref 70–99)
POTASSIUM SERPL-MCNC: 4.1 MMOL/L — SIGNIFICANT CHANGE UP (ref 3.5–5.3)
POTASSIUM SERPL-SCNC: 4.1 MMOL/L — SIGNIFICANT CHANGE UP (ref 3.5–5.3)
SARS-COV-2 IGG+IGM SERPL QL IA: >250 U/ML — HIGH
SARS-COV-2 IGG+IGM SERPL QL IA: POSITIVE
SODIUM SERPL-SCNC: 136 MMOL/L — SIGNIFICANT CHANGE UP (ref 135–145)

## 2021-06-19 PROCEDURE — 99223 1ST HOSP IP/OBS HIGH 75: CPT | Mod: GC

## 2021-06-19 PROCEDURE — 99222 1ST HOSP IP/OBS MODERATE 55: CPT

## 2021-06-19 RX ORDER — LIDOCAINE 4 G/100G
1 CREAM TOPICAL DAILY
Refills: 0 | Status: DISCONTINUED | OUTPATIENT
Start: 2021-06-19 | End: 2021-06-21

## 2021-06-19 RX ORDER — SODIUM CHLORIDE 9 MG/ML
1000 INJECTION, SOLUTION INTRAVENOUS
Refills: 0 | Status: DISCONTINUED | OUTPATIENT
Start: 2021-06-19 | End: 2021-06-20

## 2021-06-19 RX ORDER — HYDROCORTISONE 20 MG
50 TABLET ORAL EVERY 12 HOURS
Refills: 0 | Status: DISCONTINUED | OUTPATIENT
Start: 2021-06-19 | End: 2021-06-20

## 2021-06-19 RX ADMIN — SODIUM CHLORIDE 125 MILLILITER(S): 9 INJECTION, SOLUTION INTRAVENOUS at 13:09

## 2021-06-19 RX ADMIN — SODIUM CHLORIDE 75 MILLILITER(S): 9 INJECTION, SOLUTION INTRAVENOUS at 08:45

## 2021-06-19 RX ADMIN — Medication 50 MILLIGRAM(S): at 07:01

## 2021-06-19 RX ADMIN — Medication 400 MILLIGRAM(S): at 04:12

## 2021-06-19 RX ADMIN — SODIUM CHLORIDE 3 MILLILITER(S): 9 INJECTION INTRAMUSCULAR; INTRAVENOUS; SUBCUTANEOUS at 05:28

## 2021-06-19 RX ADMIN — Medication 400 MILLIGRAM(S): at 17:37

## 2021-06-19 RX ADMIN — Medication 400 MILLIGRAM(S): at 09:59

## 2021-06-19 RX ADMIN — Medication 50 MILLIGRAM(S): at 17:37

## 2021-06-19 RX ADMIN — PANTOPRAZOLE SODIUM 40 MILLIGRAM(S): 20 TABLET, DELAYED RELEASE ORAL at 05:31

## 2021-06-19 RX ADMIN — SODIUM CHLORIDE 3 MILLILITER(S): 9 INJECTION INTRAMUSCULAR; INTRAVENOUS; SUBCUTANEOUS at 13:07

## 2021-06-19 RX ADMIN — HEPARIN SODIUM 5000 UNIT(S): 5000 INJECTION INTRAVENOUS; SUBCUTANEOUS at 05:30

## 2021-06-19 RX ADMIN — OXYCODONE HYDROCHLORIDE 10 MILLIGRAM(S): 5 TABLET ORAL at 23:26

## 2021-06-19 RX ADMIN — Medication 10 MILLIGRAM(S): at 08:45

## 2021-06-19 RX ADMIN — OXYCODONE HYDROCHLORIDE 5 MILLIGRAM(S): 5 TABLET ORAL at 02:10

## 2021-06-19 RX ADMIN — HEPARIN SODIUM 5000 UNIT(S): 5000 INJECTION INTRAVENOUS; SUBCUTANEOUS at 13:09

## 2021-06-19 RX ADMIN — LIDOCAINE 1 PATCH: 4 CREAM TOPICAL at 12:33

## 2021-06-19 RX ADMIN — OXYCODONE HYDROCHLORIDE 10 MILLIGRAM(S): 5 TABLET ORAL at 08:46

## 2021-06-19 RX ADMIN — SODIUM CHLORIDE 3 MILLILITER(S): 9 INJECTION INTRAMUSCULAR; INTRAVENOUS; SUBCUTANEOUS at 21:15

## 2021-06-19 RX ADMIN — OXYCODONE HYDROCHLORIDE 10 MILLIGRAM(S): 5 TABLET ORAL at 09:40

## 2021-06-19 RX ADMIN — Medication 15 MILLIGRAM(S): at 12:34

## 2021-06-19 RX ADMIN — OXYCODONE HYDROCHLORIDE 5 MILLIGRAM(S): 5 TABLET ORAL at 02:50

## 2021-06-19 RX ADMIN — HEPARIN SODIUM 5000 UNIT(S): 5000 INJECTION INTRAVENOUS; SUBCUTANEOUS at 21:17

## 2021-06-19 RX ADMIN — LIDOCAINE 1 PATCH: 4 CREAM TOPICAL at 23:38

## 2021-06-19 RX ADMIN — POLYETHYLENE GLYCOL 3350 17 GRAM(S): 17 POWDER, FOR SOLUTION ORAL at 12:34

## 2021-06-19 RX ADMIN — Medication 15 MILLIGRAM(S): at 05:32

## 2021-06-19 NOTE — PROGRESS NOTE ADULT - ASSESSMENT
66F POD1 s/p R lap adrenalectomy.  Endo following and providing recs for steroid supplementation     - no labs  - ambulate/OOB  - dulcolax supp  - mIVF  - CLD, advance diet w/ flatus  - TOV

## 2021-06-19 NOTE — CHART NOTE - NSCHARTNOTEFT_GEN_A_CORE
Patient seen and evaluated today.  See endocrine consult attending note for plan.  Lowering hydrocortisone to 50mg IV q12h.  Please check BMP today.    Adia Conroy MD  Division of Endocrinology  Pager: [phone]    If after 6PM or before 9AM, or on weekends/holidays, please call endocrine answering service for assistance ([phone]).  For nonurgent matters email [email or web address] for assistance.

## 2021-06-19 NOTE — PROGRESS NOTE ADULT - SUBJECTIVE AND OBJECTIVE BOX
Urology Daily Progress Note    SUBJECTIVE:  Pt seen and examined, and is resting comfortably in bed. No acute events overnight. Complaining of R sided abdominal pain. Positive for flatus and BM.     OBJECTIVE:  Vital Signs Last 24 Hrs  T(C): 36.8 (19 Jun 2021 08:47), Max: 36.8 (19 Jun 2021 08:47)  T(F): 98.3 (19 Jun 2021 08:47), Max: 98.3 (19 Jun 2021 08:47)  HR: 88 (19 Jun 2021 08:47) (68 - 88)  BP: 116/68 (19 Jun 2021 08:47) (105/62 - 145/74)  BP(mean): 84 (18 Jun 2021 19:15) (84 - 92)  RR: 16 (19 Jun 2021 08:47) (13 - 18)  SpO2: 96% (19 Jun 2021 08:47) (92% - 98%)    I&O's Detail    18 Jun 2021 07:01  -  19 Jun 2021 07:00  --------------------------------------------------------  IN:    Lactated Ringers: 250 mL  Total IN: 250 mL    OUT:    Indwelling Catheter - Urethral (mL): 485 mL  Total OUT: 485 mL    Total NET: -235 mL      19 Jun 2021 07:01  -  19 Jun 2021 10:40  --------------------------------------------------------  IN:    dextrose 5% + sodium chloride 0.45%: 225 mL    Oral Fluid: 360 mL  Total IN: 585 mL    OUT:    Indwelling Catheter - Urethral (mL): 280 mL  Total OUT: 280 mL    Total NET: 305 mL        Exam:  GEN: A&Ox3, NAD  HEENT: atraumatic, normocephalic  RESP: no increased work of breathing, no use of accessory muscles  GI/ABD: soft, appropriately tender, mildly distended, no rebound or guarding, incisions c/d/i  : [place] removed on rounds  EXTREMITIES: warm, pink, well-perfused

## 2021-06-19 NOTE — CONSULT NOTE ADULT - SUBJECTIVE AND OBJECTIVE BOX
67 yo female with PMhx of HTN and Cushing's and R adrenal Mass who is s/p R adrenalectomy. Patient was initially referred to Endocrinologist Dr. Quintana in 04/2021 after she was noted to have R adrenal mass on CT scan measuring 3.9 X 3.7 cm. Patient subsequently had an MRI completed in 05/2021 which showed a 4.0 X 3.6cm R adrenal lesion with atypical features.   Hormonal workup revealed elevated serum cortisol 2.3 after dex suppression test as well as elevated urine and salivary cortisol. ACTH levels were low <1.5. Serum renin, aldosterone, and metanephrine were wnl. Patient has a h/o HTN well controlled on one agent. No h/o episodic headaches, diaphoresis. Patient dis report intermittent palpitations. No h/o easy bruising, skin changes, proximal muscle weakness, mood changes, hair growth. Patient noted to have weight gain. Patient was subsequently referred to urology who recommended R adrenalectomy, now POD1 s/p R lap adrenalectomy.  Endo following and providing recs for steroid supplementation - pt passed TOV, had small BM today    Allergies: NKDA    PMHX: HTN, Cushion  Social: Denies  FHX: NC    ROS:  No HA/DZ  No Vision changes   No CP, SOB  No N/V/D  No Edema  No Rash  NO weakness, numbness    MEDICATIONS  (STANDING):  acetaminophen  IVPB .. 1000 milliGRAM(s) IV Intermittent every 6 hours  dextrose 5% + sodium chloride 0.45%. 1000 milliLiter(s) (125 mL/Hr) IV Continuous <Continuous>  heparin   Injectable 5000 Unit(s) SubCutaneous every 8 hours  hydrocortisone sodium succinate Injectable 50 milliGRAM(s) IV Push every 12 hours  lidocaine   Patch 1 Patch Transdermal daily  pantoprazole    Tablet 40 milliGRAM(s) Oral before breakfast  polyethylene glycol 3350 17 Gram(s) Oral daily  senna 2 Tablet(s) Oral at bedtime  sodium chloride 0.9% lock flush 3 milliLiter(s) IV Push every 8 hours    MEDICATIONS  (PRN):  ondansetron Injectable 4 milliGRAM(s) IV Push every 6 hours PRN Nausea and/or Vomiting  oxyCODONE    IR 5 milliGRAM(s) Oral every 6 hours PRN Moderate Pain (4 - 6)  oxyCODONE    IR 10 milliGRAM(s) Oral every 6 hours PRN Severe Pain (7 - 10)      T(C): 36.6 (06-19-21 @ 13:06)  HR: 84 (06-19-21 @ 13:06)  BP: 127/64 (06-19-21 @ 13:06)  RR: 16 (06-19-21 @ 13:06)  SpO2: 97% (06-19-21 @ 13:06)  CAPILLARY BLOOD GLUCOSE        I&O's Summary    18 Jun 2021 07:01  -  19 Jun 2021 07:00  --------------------------------------------------------  IN: 250 mL / OUT: 485 mL / NET: -235 mL    19 Jun 2021 07:01  -  19 Jun 2021 16:58  --------------------------------------------------------  IN: 1220 mL / OUT: 380 mL / NET: 840 mL        PHYSICAL EXAM:  GENERAL: NAD, well-developed, AOx3  HEAD:  Atraumatic, Normocephalic  EYES: EOMI, PERRL, conjunctiva and sclera clear  NECK: Supple, No JVD  CHEST/LUNG: Clear to auscultation bilaterally  HEART: Regular rate and rhythm; No murmurs, rubs, or gallops, No Edema  ABDOMEN: Soft, mildly tender, Nondistended; Bowel sounds present  EXTREMITIES:  2+ Peripheral Pulses, No clubbing, cyanosis  PSYCH: No SI/HI  NEUROLOGY: non-focal  SKIN: dressing c/d/i      LABS:    06-19    136  |  101  |  17  ----------------------------<  154<H>  4.1   |  21<L>  |  0.78    Ca    8.3<L>      19 Jun 2021 12:37                    RADIOLOGY & ADDITIONAL TESTS:    Imaging Personally Reviewed:    Consultant(s) Notes Reviewed:      Care Discussed with Consultants/Other Providers:

## 2021-06-19 NOTE — CONSULT NOTE ADULT - ASSESSMENT
66F POD1 s/p R lap adrenalectomy.  Endo following and providing recs for steroid supplementation     1- s/p R lap Adrenalectomy  - per   - ambulate/OOB  - dulcolax supp  - IVF  - on CLD, advance diet per   - passed TOV  - thais Endo, titrate Solu-cortef down to BID today, lytes ok on BMP, BP stable - steroid regimen on dc per endo     2- HTN - now controlled off meds     3- DVT ppx - heparin subcut

## 2021-06-20 PROCEDURE — 99233 SBSQ HOSP IP/OBS HIGH 50: CPT

## 2021-06-20 PROCEDURE — 99232 SBSQ HOSP IP/OBS MODERATE 35: CPT

## 2021-06-20 RX ORDER — HYDROCORTISONE 20 MG
25 TABLET ORAL
Refills: 0 | Status: DISCONTINUED | OUTPATIENT
Start: 2021-06-20 | End: 2021-06-21

## 2021-06-20 RX ORDER — ACETAMINOPHEN 500 MG
650 TABLET ORAL EVERY 6 HOURS
Refills: 0 | Status: DISCONTINUED | OUTPATIENT
Start: 2021-06-20 | End: 2021-06-21

## 2021-06-20 RX ORDER — LIDOCAINE 4 G/100G
1 CREAM TOPICAL DAILY
Refills: 0 | Status: DISCONTINUED | OUTPATIENT
Start: 2021-06-20 | End: 2021-06-21

## 2021-06-20 RX ADMIN — POLYETHYLENE GLYCOL 3350 17 GRAM(S): 17 POWDER, FOR SOLUTION ORAL at 12:17

## 2021-06-20 RX ADMIN — LIDOCAINE 1 PATCH: 4 CREAM TOPICAL at 17:56

## 2021-06-20 RX ADMIN — PANTOPRAZOLE SODIUM 40 MILLIGRAM(S): 20 TABLET, DELAYED RELEASE ORAL at 05:46

## 2021-06-20 RX ADMIN — Medication 50 MILLIGRAM(S): at 05:47

## 2021-06-20 RX ADMIN — SODIUM CHLORIDE 3 MILLILITER(S): 9 INJECTION INTRAMUSCULAR; INTRAVENOUS; SUBCUTANEOUS at 23:00

## 2021-06-20 RX ADMIN — HEPARIN SODIUM 5000 UNIT(S): 5000 INJECTION INTRAVENOUS; SUBCUTANEOUS at 13:29

## 2021-06-20 RX ADMIN — Medication 650 MILLIGRAM(S): at 17:56

## 2021-06-20 RX ADMIN — Medication 25 MILLIGRAM(S): at 15:25

## 2021-06-20 RX ADMIN — LIDOCAINE 1 PATCH: 4 CREAM TOPICAL at 18:56

## 2021-06-20 RX ADMIN — Medication 650 MILLIGRAM(S): at 18:56

## 2021-06-20 RX ADMIN — SODIUM CHLORIDE 3 MILLILITER(S): 9 INJECTION INTRAMUSCULAR; INTRAVENOUS; SUBCUTANEOUS at 13:26

## 2021-06-20 RX ADMIN — LIDOCAINE 1 PATCH: 4 CREAM TOPICAL at 12:17

## 2021-06-20 RX ADMIN — HEPARIN SODIUM 5000 UNIT(S): 5000 INJECTION INTRAVENOUS; SUBCUTANEOUS at 05:46

## 2021-06-20 RX ADMIN — HEPARIN SODIUM 5000 UNIT(S): 5000 INJECTION INTRAVENOUS; SUBCUTANEOUS at 21:41

## 2021-06-20 RX ADMIN — SODIUM CHLORIDE 3 MILLILITER(S): 9 INJECTION INTRAMUSCULAR; INTRAVENOUS; SUBCUTANEOUS at 05:30

## 2021-06-20 RX ADMIN — OXYCODONE HYDROCHLORIDE 10 MILLIGRAM(S): 5 TABLET ORAL at 06:22

## 2021-06-20 RX ADMIN — ONDANSETRON 4 MILLIGRAM(S): 8 TABLET, FILM COATED ORAL at 13:30

## 2021-06-20 RX ADMIN — OXYCODONE HYDROCHLORIDE 10 MILLIGRAM(S): 5 TABLET ORAL at 00:15

## 2021-06-20 RX ADMIN — SENNA PLUS 2 TABLET(S): 8.6 TABLET ORAL at 21:40

## 2021-06-20 RX ADMIN — OXYCODONE HYDROCHLORIDE 10 MILLIGRAM(S): 5 TABLET ORAL at 07:04

## 2021-06-20 RX ADMIN — LIDOCAINE 1 PATCH: 4 CREAM TOPICAL at 18:55

## 2021-06-20 NOTE — PROGRESS NOTE ADULT - TIME BILLING
counselling regarding management of adrenal insufficiency, steroid adjustment for stress, anticipated endocrine course after dc

## 2021-06-20 NOTE — PROGRESS NOTE ADULT - ASSESSMENT
66F POD1 s/p R lap adrenalectomy.  Endo following and providing recs for steroid supplementation     1- s/p R lap Adrenalectomy  - per   - ambulate/OOB  - dulcolax supp  - IVF  - on CLD, advance diet per   - passed TOV  - thais Endo, titrate Solu-cortef down to BID yesterday, lytes ok on BMP, BP stable - steroid regimen on dc per endo     2- HTN - now controlled off meds     3- DVT ppx - heparin subcut Never

## 2021-06-20 NOTE — PROGRESS NOTE ADULT - ASSESSMENT
66F POD2 s/p R lap adrenalectomy.  Endo following and providing recs for steroid supplementation     - no labs  - ambulate/OOB  - dulcolax supp  - IVL  - plan to advance diet  - PT eval   - Possible home today vs tomorrow

## 2021-06-20 NOTE — PROGRESS NOTE ADULT - SUBJECTIVE AND OBJECTIVE BOX
Patient is a 66y old  Female who presents with a chief complaint of Adrenal adenoma (18 Jun 2021 22:00)      SUBJECTIVE / OVERNIGHT EVENTS: feels well, no events     ROS:  No HA/DZ  No Vision changes   No CP, SOB  No N/V/D  No Edema  No Rash  NO weakness, numbness    MEDICATIONS  (STANDING):  dextrose 5% + sodium chloride 0.45%. 1000 milliLiter(s) (125 mL/Hr) IV Continuous <Continuous>  heparin   Injectable 5000 Unit(s) SubCutaneous every 8 hours  hydrocortisone sodium succinate Injectable 50 milliGRAM(s) IV Push every 12 hours  lidocaine   Patch 1 Patch Transdermal daily  pantoprazole    Tablet 40 milliGRAM(s) Oral before breakfast  polyethylene glycol 3350 17 Gram(s) Oral daily  senna 2 Tablet(s) Oral at bedtime  sodium chloride 0.9% lock flush 3 milliLiter(s) IV Push every 8 hours    MEDICATIONS  (PRN):  ondansetron Injectable 4 milliGRAM(s) IV Push every 6 hours PRN Nausea and/or Vomiting  oxyCODONE    IR 5 milliGRAM(s) Oral every 6 hours PRN Moderate Pain (4 - 6)  oxyCODONE    IR 10 milliGRAM(s) Oral every 6 hours PRN Severe Pain (7 - 10)      T(C): 36.7 (06-20-21 @ 09:51)  HR: 97 (06-20-21 @ 09:51)  BP: 129/70 (06-20-21 @ 09:51)  RR: 16 (06-20-21 @ 09:51)  SpO2: 95% (06-20-21 @ 09:51)  CAPILLARY BLOOD GLUCOSE        I&O's Summary    19 Jun 2021 07:01  -  20 Jun 2021 07:00  --------------------------------------------------------  IN: 2205 mL / OUT: 1980 mL / NET: 225 mL    20 Jun 2021 07:01  -  20 Jun 2021 11:42  --------------------------------------------------------  IN: 735 mL / OUT: 850 mL / NET: -115 mL        PHYSICAL EXAM:  GENERAL: NAD, well-developed, AOx3  HEAD:  Atraumatic, Normocephalic  EYES: EOMI, PERRL, conjunctiva and sclera clear  NECK: Supple, No JVD  CHEST/LUNG: Clear to auscultation bilaterally  HEART: Regular rate and rhythm; No murmurs, rubs, or gallops, No Edema  ABDOMEN: Soft, Nontender, Nondistended; Bowel sounds present, incision c.d.i   EXTREMITIES:  2+ Peripheral Pulses, No clubbing, cyanosis  PSYCH: No SI/HI  NEUROLOGY: non-focal  SKIN: No rashes or lesions    LABS:    06-19    136  |  101  |  17  ----------------------------<  154<H>  4.1   |  21<L>  |  0.78    Ca    8.3<L>      19 Jun 2021 12:37                    RADIOLOGY & ADDITIONAL TESTS:    Imaging Personally Reviewed:    Consultant(s) Notes Reviewed:      Care Discussed with Consultants/Other Providers:

## 2021-06-20 NOTE — PROGRESS NOTE ADULT - ASSESSMENT
67 yo female with PMhx of HTN and Cushing's and R adrenal Mass who presented for scheduled R adrenalectomy.    Problem 1: Cushing's s/p R adrenalectomy  Elevated outpatient serum cortisol 2.3 after dex suppression test as well as elevated urine and salivary cortisol. ACTH levels <1.5.   - Started 50mg IV hydrocortisone q8hrs postop, then tapered to 50mg IV q12h yesterday.  -Patient still reporting pain but is otherwise hemodynamically stable and improving.  -Will change to oral dosing and lower dose to hydrocortisone 25mg PO daily at 8AM and 3PM (first dose now).  -Intended discharge dosing will likely be hydrocortisone 20mg at 8AM and 10mg at 3PM.  Patient reports she wakes up early at 4-5 AM - explained ok to take morning dose at home ealier at around 6-7 AM.  -Reviewed sick day management to double dose if sick or unwell/stressed.  Prescribe home BP monitor at discharge  Reviewed signs and symptoms of adrenal insufficiency and what to monitor for at home.  - Patient should f/u with Outpatient Endocrinologist Dr. Quintana. Will need to schedule a follow up with Dr. Quintana within a few weeks for close monitoring. Will notify Dr. Quintana.  -Patient is requesting being advanced to solid diet  -Anticipate discharge tomorrow given ongoing issues with pain control and mobility    Problem 2: R adrenal Mass s/p R adrenalectomy  MRI completed in 05/2021 which showed a 4.0 X 3.6cm R adrenal lesion with atypical features.   - Recommendations as noted above  - F/u surgical pathology as outpatient    Problem 3: Hypertension  - Hold home BP medication (diltiazem) at this time  BP not elevated. Reviewed not to resume at home unless instructed by her doctor for rising BP.    Problem 4: Obesity  - Encourage lifestyle modifications  -S/p removal of cortisol hypersecretion may help with weight loss.    Discussed with urology team as well as medicine Dr. Baker.    Adia Conroy MD  Division of Endocrinology  Pager: [phone]    If after 6PM or before 9AM, or on weekends/holidays, please call endocrine answering service for assistance ([phone]).  For nonurgent matters email [email or web address] for assistance.

## 2021-06-20 NOTE — PROGRESS NOTE ADULT - SUBJECTIVE AND OBJECTIVE BOX
Urology Daily Progress Note    SUBJECTIVE:  Pt seen and examined, and is resting comfortably in bed. No acute events overnight. Pain is adequately controlled on current regimen. Pt feels more sore than yesterday.  Was able to ambulate down the unit.  Had small bowel unit.  Denies flatus.     OBJECTIVE:  Vital Signs Last 24 Hrs  T(C): 36.3 (20 Jun 2021 05:29), Max: 36.9 (19 Jun 2021 17:28)  T(F): 97.3 (20 Jun 2021 05:29), Max: 98.5 (19 Jun 2021 17:28)  HR: 90 (20 Jun 2021 05:29) (77 - 90)  BP: 121/59 (20 Jun 2021 05:29) (106/54 - 132/71)  BP(mean): --  RR: 16 (20 Jun 2021 05:29) (16 - 18)  SpO2: 95% (20 Jun 2021 05:29) (92% - 97%)    I&O's Detail    19 Jun 2021 07:01  -  20 Jun 2021 07:00  --------------------------------------------------------  IN:    dextrose 5% + sodium chloride 0.45%: 1125 mL    Oral Fluid: 1080 mL  Total IN: 2205 mL    OUT:    Indwelling Catheter - Urethral (mL): 280 mL    Voided (mL): 1700 mL  Total OUT: 1980 mL    Total NET: 225 mL        Exam:  GEN: A&Ox3, NAD  HEENT: atraumatic, normocephalic  RESP: no increased work of breathing, no use of accessory muscles  GI/ABD: soft, appropriately tender, mildly distended, no rebound or guarding, incisions c/d/i  EXTREMITIES: warm, pink, well-perfused      06-19    136  |  101  |  17  ----------------------------<  154<H>  4.1   |  21<L>  |  0.78    Ca    8.3<L>      19 Jun 2021 12:37

## 2021-06-20 NOTE — PROGRESS NOTE ADULT - SUBJECTIVE AND OBJECTIVE BOX
Chief Complaint: S/p R adrenalectomy    History: ongoing complaints of pain  tolerating liquid diet would like diet advanced  has many questions about medication management after discharge  does not have a BP monitor at home    MEDICATIONS  (STANDING):  dextrose 5% + sodium chloride 0.45%. 1000 milliLiter(s) (125 mL/Hr) IV Continuous <Continuous>  heparin   Injectable 5000 Unit(s) SubCutaneous every 8 hours  hydrocortisone 25 milliGRAM(s) Oral <User Schedule>  lidocaine   Patch 1 Patch Transdermal daily  pantoprazole    Tablet 40 milliGRAM(s) Oral before breakfast  polyethylene glycol 3350 17 Gram(s) Oral daily  senna 2 Tablet(s) Oral at bedtime  sodium chloride 0.9% lock flush 3 milliLiter(s) IV Push every 8 hours    MEDICATIONS  (PRN):  ondansetron Injectable 4 milliGRAM(s) IV Push every 6 hours PRN Nausea and/or Vomiting  oxyCODONE    IR 5 milliGRAM(s) Oral every 6 hours PRN Moderate Pain (4 - 6)  oxyCODONE    IR 10 milliGRAM(s) Oral every 6 hours PRN Severe Pain (7 - 10)      Allergies    No Known Allergies    Intolerances      Review of Systems:  ALL OTHER SYSTEMS REVIEWED AND NEGATIVE      PHYSICAL EXAM:  VITALS: T(C): 36.7 (06-20-21 @ 09:51)  T(F): 98.1 (06-20-21 @ 09:51), Max: 98.5 (06-19-21 @ 17:28)  HR: 96 (06-20-21 @ 13:27) (77 - 97)  BP: 128/76 (06-20-21 @ 13:27) (106/54 - 132/71)  RR:  (16 - 18)  SpO2:  (92% - 95%)  Wt(kg): --  GENERAL: NAD, well-groomed, well-developed  EYES: No proptosis, no lid lag, anicteric  HEENT:  Atraumatic, Normocephalic, moist mucous membranes  RESPIRATORY: nonlabored respirations, no wheezing  PSYCH: Alert and oriented x 3, normal affect, normal mood    CAPILLARY BLOOD GLUCOSE          06-19    136  |  101  |  17  ----------------------------<  154<H>  4.1   |  21<L>  |  0.78    EGFR if : 91  EGFR if non : 78    Ca    8.3<L>      06-19        A1C with Estimated Average Glucose Result: 5.5 % (06-19-21 @ 12:37)      Thyroid Function Tests:

## 2021-06-20 NOTE — PROGRESS NOTE ADULT - ATTENDING COMMENTS
I have seen and examined the patient.   I have reviewed the note.   I agree with A/P    imp: S/p right adrenalectomy   pain in the right flank better  she was out of bed   eating   not passing BM yet   transition to the oral hydrocortisone       Plan: ambulate, advance diet as tolerated - ate mashed potato  Appreciate endocrinology follow up   d/c planning as per Dr. Alfonso

## 2021-06-21 ENCOUNTER — TRANSCRIPTION ENCOUNTER (OUTPATIENT)
Age: 66
End: 2021-06-21

## 2021-06-21 VITALS
RESPIRATION RATE: 17 BRPM | TEMPERATURE: 98 F | OXYGEN SATURATION: 95 % | SYSTOLIC BLOOD PRESSURE: 132 MMHG | HEART RATE: 90 BPM | DIASTOLIC BLOOD PRESSURE: 75 MMHG

## 2021-06-21 DIAGNOSIS — Z01.818 ENCOUNTER FOR OTHER PREPROCEDURAL EXAMINATION: ICD-10-CM

## 2021-06-21 LAB
ALBUMIN SERPL ELPH-MCNC: 3.6 G/DL — SIGNIFICANT CHANGE UP (ref 3.3–5)
ALP SERPL-CCNC: 89 U/L — SIGNIFICANT CHANGE UP (ref 40–120)
ALT FLD-CCNC: 109 U/L — HIGH (ref 4–33)
ANION GAP SERPL CALC-SCNC: 11 MMOL/L — SIGNIFICANT CHANGE UP (ref 7–14)
AST SERPL-CCNC: 39 U/L — HIGH (ref 4–32)
BILIRUB SERPL-MCNC: 0.7 MG/DL — SIGNIFICANT CHANGE UP (ref 0.2–1.2)
BUN SERPL-MCNC: 10 MG/DL — SIGNIFICANT CHANGE UP (ref 7–23)
CALCIUM SERPL-MCNC: 8.8 MG/DL — SIGNIFICANT CHANGE UP (ref 8.4–10.5)
CHLORIDE SERPL-SCNC: 104 MMOL/L — SIGNIFICANT CHANGE UP (ref 98–107)
CO2 SERPL-SCNC: 27 MMOL/L — SIGNIFICANT CHANGE UP (ref 22–31)
CREAT SERPL-MCNC: 0.75 MG/DL — SIGNIFICANT CHANGE UP (ref 0.5–1.3)
GLUCOSE SERPL-MCNC: 95 MG/DL — SIGNIFICANT CHANGE UP (ref 70–99)
MAGNESIUM SERPL-MCNC: 2 MG/DL — SIGNIFICANT CHANGE UP (ref 1.6–2.6)
PHOSPHATE SERPL-MCNC: 3.2 MG/DL — SIGNIFICANT CHANGE UP (ref 2.5–4.5)
POTASSIUM SERPL-MCNC: 3.7 MMOL/L — SIGNIFICANT CHANGE UP (ref 3.5–5.3)
POTASSIUM SERPL-SCNC: 3.7 MMOL/L — SIGNIFICANT CHANGE UP (ref 3.5–5.3)
PROT SERPL-MCNC: 6.4 G/DL — SIGNIFICANT CHANGE UP (ref 6–8.3)
SODIUM SERPL-SCNC: 142 MMOL/L — SIGNIFICANT CHANGE UP (ref 135–145)

## 2021-06-21 PROCEDURE — 99232 SBSQ HOSP IP/OBS MODERATE 35: CPT

## 2021-06-21 RX ORDER — ACETAMINOPHEN 500 MG
2 TABLET ORAL
Qty: 0 | Refills: 0 | DISCHARGE
Start: 2021-06-21

## 2021-06-21 RX ORDER — POTASSIUM CHLORIDE 20 MEQ
20 PACKET (EA) ORAL ONCE
Refills: 0 | Status: COMPLETED | OUTPATIENT
Start: 2021-06-21 | End: 2021-06-21

## 2021-06-21 RX ORDER — DILTIAZEM HCL 120 MG
1 CAPSULE, EXT RELEASE 24 HR ORAL
Qty: 0 | Refills: 0 | DISCHARGE

## 2021-06-21 RX ADMIN — SODIUM CHLORIDE 3 MILLILITER(S): 9 INJECTION INTRAMUSCULAR; INTRAVENOUS; SUBCUTANEOUS at 06:30

## 2021-06-21 RX ADMIN — LIDOCAINE 1 PATCH: 4 CREAM TOPICAL at 05:43

## 2021-06-21 RX ADMIN — Medication 650 MILLIGRAM(S): at 01:30

## 2021-06-21 RX ADMIN — Medication 25 MILLIGRAM(S): at 07:17

## 2021-06-21 RX ADMIN — Medication 650 MILLIGRAM(S): at 08:20

## 2021-06-21 RX ADMIN — ONDANSETRON 4 MILLIGRAM(S): 8 TABLET, FILM COATED ORAL at 07:23

## 2021-06-21 RX ADMIN — LIDOCAINE 1 PATCH: 4 CREAM TOPICAL at 11:30

## 2021-06-21 RX ADMIN — LIDOCAINE 1 PATCH: 4 CREAM TOPICAL at 11:31

## 2021-06-21 RX ADMIN — LIDOCAINE 1 PATCH: 4 CREAM TOPICAL at 00:30

## 2021-06-21 RX ADMIN — Medication 650 MILLIGRAM(S): at 00:35

## 2021-06-21 RX ADMIN — Medication 20 MILLIEQUIVALENT(S): at 11:30

## 2021-06-21 RX ADMIN — HEPARIN SODIUM 5000 UNIT(S): 5000 INJECTION INTRAVENOUS; SUBCUTANEOUS at 05:37

## 2021-06-21 RX ADMIN — PANTOPRAZOLE SODIUM 40 MILLIGRAM(S): 20 TABLET, DELAYED RELEASE ORAL at 07:17

## 2021-06-21 RX ADMIN — Medication 650 MILLIGRAM(S): at 07:23

## 2021-06-21 NOTE — DISCHARGE NOTE NURSING/CASE MANAGEMENT/SOCIAL WORK - NSDCPNINST_GEN_ALL_CORE
Notify Dr Alfonso if you experience any increase in pain not relieved with medication, any redness drainage or swelling around incision any persistent nausea vomiting or fever >100.5.  Drink plenty of fluids.  No heavy lifting or straining.   Notify Dr Alfonso if you experience any increase in pain not relieved with medication, any redness drainage or swelling around incision any persistent nausea vomiting or fever >100.5.  Drink plenty of fluids.  No heavy lifting or straining.  Use over the counter stool softeners to assist with constipation.

## 2021-06-21 NOTE — DISCHARGE NOTE NURSING/CASE MANAGEMENT/SOCIAL WORK - NSDPDISTO_GEN_ALL_CORE
stable, tolerating diet, vopiding well/Home stable, tolerating diet, voiding well, abdominal lap sites clean dry and intact/Home

## 2021-06-21 NOTE — PROGRESS NOTE ADULT - PROBLEM SELECTOR PROBLEM 4
Class 2 obesity with body mass index (BMI) of 36.0 to 36.9 in adult, unspecified obesity type, unspecified whether serious comorbidity present
Class 2 obesity with body mass index (BMI) of 36.0 to 36.9 in adult, unspecified obesity type, unspecified whether serious comorbidity present

## 2021-06-21 NOTE — DISCHARGE NOTE NURSING/CASE MANAGEMENT/SOCIAL WORK - NSDCPECAREGIVERED_GEN_ALL_CORE
carenotes on adrenalectomy, managing pain, carenotes on d/c medications, carenotes on lap adrenalectomy, managing pain handout, carenotes on d/c medications

## 2021-06-21 NOTE — DISCHARGE NOTE PROVIDER - NSDCCPCAREPLAN_GEN_ALL_CORE_FT
PRINCIPAL DISCHARGE DIAGNOSIS  Diagnosis: Other specified disorders of adrenal gland  Assessment and Plan of Treatment: Drink plenty of fluids  Do not take cardizem; f/u with your PMD if needed for blood pressure  F/U with your endocrinologist; you are on prednisone, which is new for you  Call your urologist for f/u appt

## 2021-06-21 NOTE — DISCHARGE NOTE PROVIDER - CARE PROVIDER_API CALL
Roldan Alfonso)  Urology  [address] [place] [address], [place] [address]  Phone: [phone]  Fax: [phone]  Follow Up Time:

## 2021-06-21 NOTE — PROGRESS NOTE ADULT - SUBJECTIVE AND OBJECTIVE BOX
Chief Complaint:     History:    MEDICATIONS  (STANDING):  heparin   Injectable 5000 Unit(s) SubCutaneous every 8 hours  hydrocortisone 25 milliGRAM(s) Oral <User Schedule>  lidocaine   Patch 1 Patch Transdermal daily  lidocaine   Patch 1 Patch Transdermal daily  pantoprazole    Tablet 40 milliGRAM(s) Oral before breakfast  polyethylene glycol 3350 17 Gram(s) Oral daily  potassium chloride    Tablet ER 20 milliEquivalent(s) Oral once  senna 2 Tablet(s) Oral at bedtime  sodium chloride 0.9% lock flush 3 milliLiter(s) IV Push every 8 hours    MEDICATIONS  (PRN):  acetaminophen   Tablet .. 650 milliGRAM(s) Oral every 6 hours PRN Mild Pain (1 - 3)  ondansetron Injectable 4 milliGRAM(s) IV Push every 6 hours PRN Nausea and/or Vomiting  oxyCODONE    IR 10 milliGRAM(s) Oral every 6 hours PRN Severe Pain (7 - 10)  oxyCODONE    IR 5 milliGRAM(s) Oral every 6 hours PRN Moderate Pain (4 - 6)      Allergies    No Known Allergies    Intolerances      Review of Systems:  Constitutional: No fever  Eyes: No blurry vision  Neuro: No tremors  HEENT: No pain  Cardiovascular: No chest pain, palpitations  Respiratory: No SOB, no cough  GI: No nausea, vomiting, abdominal pain  : No dysuria  Skin: no rash  Psych: no depression  Endocrine: no polyuria, polydipsia  Hem/lymph: no swelling  Osteoporosis: no fractures    ALL OTHER SYSTEMS REVIEWED AND NEGATIVE    UNABLE TO OBTAIN    PHYSICAL EXAM:  VITALS: T(C): 36.6 (06-21-21 @ 09:40)  T(F): 97.8 (06-21-21 @ 09:40), Max: 98.6 (06-20-21 @ 17:53)  HR: 90 (06-21-21 @ 09:40) (86 - 96)  BP: 132/75 (06-21-21 @ 09:40) (119/64 - 150/68)  RR:  (17 - 18)  SpO2:  (94% - 96%)  Wt(kg): --  GENERAL: NAD, well-groomed, well-developed  EYES: No proptosis, no lid lag, anicteric  HEENT:  Atraumatic, Normocephalic, moist mucous membranes  THYROID: Normal size, no palpable nodules  RESPIRATORY: Clear to auscultation bilaterally; No rales, rhonchi, wheezing, or rubs  CARDIOVASCULAR: Regular rate and rhythm; No murmurs; no peripheral edema  GI: Soft, nontender, non distended, normal bowel sounds  SKIN: Dry, intact, No rashes or lesions  MUSCULOSKELETAL: Full range of motion, normal strength  NEURO: sensation intact, extraocular movements intact, no tremor, normal reflexes  PSYCH: Alert and oriented x 3, normal affect, normal mood  CUSHING'S SIGNS: no striae        06-21    142  |  104  |  10  ----------------------------<  95  3.7   |  27  |  0.75    EGFR if : 95  EGFR if non : 82    Ca    8.8      06-21  Mg     2.0     06-21  Phos  3.2     06-21    TPro  6.4  /  Alb  3.6  /  TBili  0.7  /  DBili  x   /  AST  39<H>  /  ALT  109<H>  /  AlkPhos  89  06-21          Thyroid Function Tests:                           Chief Complaint: s/p  Adrenalectomy/subclinical cushings    History: Patient is to be discharged home. Vitals are stable and electrolytes WNL.     MEDICATIONS  (STANDING):  heparin   Injectable 5000 Unit(s) SubCutaneous every 8 hours  hydrocortisone 25 milliGRAM(s) Oral <User Schedule>  lidocaine   Patch 1 Patch Transdermal daily  lidocaine   Patch 1 Patch Transdermal daily  pantoprazole    Tablet 40 milliGRAM(s) Oral before breakfast  polyethylene glycol 3350 17 Gram(s) Oral daily  potassium chloride    Tablet ER 20 milliEquivalent(s) Oral once  senna 2 Tablet(s) Oral at bedtime  sodium chloride 0.9% lock flush 3 milliLiter(s) IV Push every 8 hours    MEDICATIONS  (PRN):  acetaminophen   Tablet .. 650 milliGRAM(s) Oral every 6 hours PRN Mild Pain (1 - 3)  ondansetron Injectable 4 milliGRAM(s) IV Push every 6 hours PRN Nausea and/or Vomiting  oxyCODONE    IR 10 milliGRAM(s) Oral every 6 hours PRN Severe Pain (7 - 10)  oxyCODONE    IR 5 milliGRAM(s) Oral every 6 hours PRN Moderate Pain (4 - 6)      Allergies    No Known Allergies    Intolerances      Review of Systems:  Constitutional: No fever  Eyes: No blurry vision  Neuro: No tremors  HEENT: No pain  Cardiovascular: No chest pain, palpitations  Respiratory: No SOB, no cough  GI: No nausea, vomiting, abdominal pain  : No dysuria  Skin: no rash  Psych: no depression  Endocrine: no polyuria, polydipsia      ALL OTHER SYSTEMS REVIEWED AND NEGATIVE        PHYSICAL EXAM:  VITALS: T(C): 36.6 (06-21-21 @ 09:40)  T(F): 97.8 (06-21-21 @ 09:40), Max: 98.6 (06-20-21 @ 17:53)  HR: 90 (06-21-21 @ 09:40) (86 - 96)  BP: 132/75 (06-21-21 @ 09:40) (119/64 - 150/68)  RR:  (17 - 18)  SpO2:  (94% - 96%)  Wt(kg): --  GENERAL: NAD, well-groomed, well-developed  EYES: No proptosis, no lid lag, anicteric  HEENT:  Atraumatic, Normocephalic, moist mucous membranes  RESPIRATORY: Clear to auscultation bilaterally  CARDIOVASCULAR: Regular rate and rhythm  GI: Soft, nontender, non distended, normal bowel sounds  SKIN: Dry, intact, No rashes or lesions  MUSCULOSKELETAL: Full range of motion, normal strength  NEURO: sensation intact, extraocular movements intact, no tremor, normal reflexes  PSYCH: Alert and oriented x 3, normal affect, normal mood          06-21    142  |  104  |  10  ----------------------------<  95  3.7   |  27  |  0.75    EGFR if : 95  EGFR if non : 82    Ca    8.8      06-21  Mg     2.0     06-21  Phos  3.2     06-21    TPro  6.4  /  Alb  3.6  /  TBili  0.7  /  DBili  x   /  AST  39<H>  /  ALT  109<H>  /  AlkPhos  89  06-21

## 2021-06-21 NOTE — DISCHARGE NOTE NURSING/CASE MANAGEMENT/SOCIAL WORK - PATIENT PORTAL LINK FT
You can access the FollowMyHealth Patient Portal offered by [facility] by registering at the following website: [email or web address]. By joining [facility]’s FollowMyHealth portal, you will also be able to view your health information using other applications (apps) compatible with our system.

## 2021-06-21 NOTE — DISCHARGE NOTE PROVIDER - NSDCMRMEDTOKEN_GEN_ALL_CORE_FT
acetaminophen 325 mg oral tablet: 2 tab(s) orally every 6 hours, As needed, Mild Pain (1 - 3)  Fish Oil oral capsule: 1 cap(s) orally once a day - last dose 6/17/21  Multivitamin: 1 tab(s) orally once a day - last dose 6/17/21  omeprazole 40 mg oral delayed release capsule: 1 cap(s) orally once a day  Osteo Bi-Flex 250 mg-200 mg oral tablet: 1 tab(s) orally once a day, last dose 6/17/2021  predniSONE 10 mg oral tablet: 2 tab(s) orally once a day  Take at 7AM or 8AM  Take 1 tab (10mg) at 3pm every day  Vitamin C 500 mg oral capsule: 2 cap(s) orally once a day  Vitamin D3 1000 intl units (25 mcg) oral tablet: 1 tab(s) orally once a day  Zinc 140 mg (as elemental zinc 50 mg) oral tablet: 1 tab(s) orally once a day

## 2021-06-21 NOTE — DISCHARGE NOTE PROVIDER - HOSPITAL COURSE
Pt had R. lap adrenalectomy 3 days ago; ambulating (requested P.T. for stairs);  pain is controlled; passed gas and ml reg diet; labs stable; seen by endocrinology and started on new prednisone; cardizem stopped; will f/u with her endocrinologist and PMD.

## 2021-06-21 NOTE — DISCHARGE NOTE PROVIDER - NSDCFUSCHEDAPPT_GEN_ALL_CORE_FT
ERNST RAE ; 06/22/2021 ; [facility] [address] [facility] [address] ERNST Chau ; 09/02/2021 ; NEHA Irvin [address]

## 2021-06-21 NOTE — PROGRESS NOTE ADULT - SUBJECTIVE AND OBJECTIVE BOX
[facility] Division of Hospital Medicine  Georgette Olson MD  Pager (M-F, 8A-5P):  In-house pager [phone]; Long-range pager [phone]  Other Times:  Please page Hospitalist in Charge -  In-house pager [phone]    Patient is a 66y old  Female who presents with a chief complaint of Adrenal adenoma (18 Jun 2021 22:00)      SUBJECTIVE / OVERNIGHT EVENTS: ...  ADDITIONAL REVIEW OF SYSTEMS:    MEDICATIONS  (STANDING):  heparin   Injectable 5000 Unit(s) SubCutaneous every 8 hours  hydrocortisone 25 milliGRAM(s) Oral <User Schedule>  lidocaine   Patch 1 Patch Transdermal daily  lidocaine   Patch 1 Patch Transdermal daily  pantoprazole    Tablet 40 milliGRAM(s) Oral before breakfast  polyethylene glycol 3350 17 Gram(s) Oral daily  potassium chloride    Tablet ER 20 milliEquivalent(s) Oral once  senna 2 Tablet(s) Oral at bedtime  sodium chloride 0.9% lock flush 3 milliLiter(s) IV Push every 8 hours    MEDICATIONS  (PRN):  acetaminophen   Tablet .. 650 milliGRAM(s) Oral every 6 hours PRN Mild Pain (1 - 3)  ondansetron Injectable 4 milliGRAM(s) IV Push every 6 hours PRN Nausea and/or Vomiting  oxyCODONE    IR 10 milliGRAM(s) Oral every 6 hours PRN Severe Pain (7 - 10)  oxyCODONE    IR 5 milliGRAM(s) Oral every 6 hours PRN Moderate Pain (4 - 6)      CAPILLARY BLOOD GLUCOSE        I&O's Summary    20 Jun 2021 07:01  -  21 Jun 2021 07:00  --------------------------------------------------------  IN: 1575 mL / OUT: 2650 mL / NET: -1075 mL        PHYSICAL EXAM:  Vital Signs Last 24 Hrs  T(C): 36.8 (21 Jun 2021 05:38), Max: 37 (20 Jun 2021 17:53)  T(F): 98.2 (21 Jun 2021 05:38), Max: 98.6 (20 Jun 2021 17:53)  HR: 90 (21 Jun 2021 05:38) (86 - 97)  BP: 130/67 (21 Jun 2021 05:38) (119/64 - 150/68)  BP(mean): --  RR: 17 (21 Jun 2021 05:38) (16 - 18)  SpO2: 94% (21 Jun 2021 05:38) (94% - 96%)    GENERAL: NAD, well-developed, AOx3  HEAD:  Atraumatic, Normocephalic  EYES: EOMI, PERRL, conjunctiva and sclera clear  NECK: Supple, No JVD  CHEST/LUNG: Clear to auscultation bilaterally  HEART: Regular rate and rhythm; No murmurs, rubs, or gallops, No Edema  ABDOMEN: Soft, Nontender, Nondistended; Bowel sounds present, incision c.d.i   EXTREMITIES:  2+ Peripheral Pulses, No clubbing, cyanosis  PSYCH: No SI/HI  NEUROLOGY: non-focal  SKIN: No rashes or lesions    LABS:    06-21    142  |  104  |  10  ----------------------------<  95  3.7   |  27  |  0.75    Ca    8.8      21 Jun 2021 06:42  Phos  3.2     06-21  Mg     2.0     06-21    TPro  6.4  /  Alb  3.6  /  TBili  0.7  /  DBili  x   /  AST  39<H>  /  ALT  109<H>  /  AlkPhos  89  06-21    RADIOLOGY & ADDITIONAL TESTS:  Results Reviewed:   Imaging Personally Reviewed:  Electrocardiogram Personally Reviewed:    COORDINATION OF CARE:  Care Discussed with Consultants/Other Providers [Y/N]: urology re overall care  Prior or Outpatient Records Reviewed [Y/N]:   [facility] Division of Hospital Medicine  Georgette Olson MD  Pager (M-F, 8A-5P):  In-house pager [phone]; Long-range pager [phone]  Other Times:  Please page Hospitalist in Charge -  In-house pager [phone]    Patient is a 66y old  Female who presents with a chief complaint of Adrenal adenoma (18 Jun 2021 22:00)      SUBJECTIVE / OVERNIGHT EVENTS: Mild nausea, eating small amount solids since yesterday.  +flatus, walking in room, pain controlled.  ADDITIONAL REVIEW OF SYSTEMS:    MEDICATIONS  (STANDING):  heparin   Injectable 5000 Unit(s) SubCutaneous every 8 hours  hydrocortisone 25 milliGRAM(s) Oral <User Schedule>  lidocaine   Patch 1 Patch Transdermal daily  lidocaine   Patch 1 Patch Transdermal daily  pantoprazole    Tablet 40 milliGRAM(s) Oral before breakfast  polyethylene glycol 3350 17 Gram(s) Oral daily  potassium chloride    Tablet ER 20 milliEquivalent(s) Oral once  senna 2 Tablet(s) Oral at bedtime  sodium chloride 0.9% lock flush 3 milliLiter(s) IV Push every 8 hours    MEDICATIONS  (PRN):  acetaminophen   Tablet .. 650 milliGRAM(s) Oral every 6 hours PRN Mild Pain (1 - 3)  ondansetron Injectable 4 milliGRAM(s) IV Push every 6 hours PRN Nausea and/or Vomiting  oxyCODONE    IR 10 milliGRAM(s) Oral every 6 hours PRN Severe Pain (7 - 10)  oxyCODONE    IR 5 milliGRAM(s) Oral every 6 hours PRN Moderate Pain (4 - 6)    I&O's Summary    20 Jun 2021 07:01  -  21 Jun 2021 07:00  --------------------------------------------------------  IN: 1575 mL / OUT: 2650 mL / NET: -1075 mL    PHYSICAL EXAM:  Vital Signs Last 24 Hrs  T(C): 36.8 (21 Jun 2021 05:38), Max: 37 (20 Jun 2021 17:53)  T(F): 98.2 (21 Jun 2021 05:38), Max: 98.6 (20 Jun 2021 17:53)  HR: 90 (21 Jun 2021 05:38) (86 - 97)  BP: 130/67 (21 Jun 2021 05:38) (119/64 - 150/68)  BP(mean): --  RR: 17 (21 Jun 2021 05:38) (16 - 18)  SpO2: 94% (21 Jun 2021 05:38) (94% - 96%)    GENERAL: sitting up in chair, looks comfortable  CHEST/LUNG: Clear to auscultation bilaterally  HEART: Regular rate and rhythm; No murmurs, rubs, or gallops, No Edema  ABDOMEN: Soft, Nontender, Nondistended; Bowel sounds present, incision c.d.i   EXTREMITIES:  2+ Peripheral Pulses, No clubbing, cyanosis  SKIN: No rashes or lesions    LABS:    06-21    142  |  104  |  10  ----------------------------<  95  3.7   |  27  |  0.75    Ca    8.8      21 Jun 2021 06:42  Phos  3.2     06-21  Mg     2.0     06-21    TPro  6.4  /  Alb  3.6  /  TBili  0.7  /  DBili  x   /  AST  39<H>  /  ALT  109<H>  /  AlkPhos  89  06-21    RADIOLOGY & ADDITIONAL TESTS:  Results Reviewed:   Imaging Personally Reviewed:  Electrocardiogram Personally Reviewed:    COORDINATION OF CARE:  Care Discussed with Consultants/Other Providers [Y/N]: urology re overall care  Prior or Outpatient Records Reviewed [Y/N]:

## 2021-06-21 NOTE — PROGRESS NOTE ADULT - SUBJECTIVE AND OBJECTIVE BOX
POD #3  Afeb 130/67 90 94%RA    Pt has no c/o  Abd- soft NT ND; + flatus     wounds C&D  Void 1L  OOB

## 2021-06-21 NOTE — PROGRESS NOTE ADULT - ASSESSMENT
66F POD1 s/p R lap adrenalectomy.  Endo following and providing recs for steroid supplementation     1- s/p R lap Adrenalectomy  - per   - ambulate/OOB  - dulcolax supp  - IVF  - on CLD, advance diet per   - passed TOV  - [facility] End[facility], titrate Solu-cortef down to BID yesterday, lytes ok on BMP, BP stable - steroid regimen on dc per endo     2- HTN - now controlled off meds     3- DVT ppx - heparin subcut 66F POD1 s/p R lap adrenalectomy.  Endo following and providing recs for steroid supplementation     1- s/p R lap Adrenalectomy  - per   - ambulate/OOB  - dulcolax supp  - IVF  - diet as tolerates  - passed TOV  - thais Endo, BP stable - steroid regimen on dc per endo     2- HTN - now controlled off meds     3- DVT ppx - heparin subcut

## 2021-06-21 NOTE — PROGRESS NOTE ADULT - ASSESSMENT
65 yo female with PMhx of HTN and Cushing's and R adrenal Mass who presented for scheduled R adrenalectomy.    Problem 1: Cushing's s/p R adrenalectomy  Elevated outpatient serum cortisol 2.3 after dex suppression test as well as elevated urine and salivary cortisol. ACTH levels <1.5.   - Started 50mg IV hydrocortisone q8hrs postop, then tapered to 50mg IV q12h yesterday.  -Patient still reporting pain but is otherwise hemodynamically stable and improving.  -Will change to oral dosing and lower dose to hydrocortisone 25mg PO daily at 8AM and 3PM (first dose now).  -Intended discharge dosing will likely be hydrocortisone 20mg at 8AM and 10mg at 3PM.  Patient reports she wakes up early at 4-5 AM - explained ok to take morning dose at home ealier at around 6-7 AM.  -Reviewed sick day management to double dose if sick or unwell/stressed.  Prescribe home BP monitor at discharge  Reviewed signs and symptoms of adrenal insufficiency and what to monitor for at home.  - Patient should f/u with Outpatient Endocrinologist Dr. Quintana. Will need to schedule a follow up with Dr. Quintana within a few weeks for close monitoring. Will notify Dr. Quintana.  -Patient is requesting being advanced to solid diet  -Anticipate discharge tomorrow given ongoing issues with pain control and mobility    Problem 2: R adrenal Mass s/p R adrenalectomy  MRI completed in 05/2021 which showed a 4.0 X 3.6cm R adrenal lesion with atypical features.   - Recommendations as noted above  - F/u surgical pathology as outpatient    Problem 3: Hypertension  - Hold home BP medication (diltiazem) at this time  BP not elevated. Reviewed not to resume at home unless instructed by her doctor for rising BP.    Problem 4: Obesity  - Encourage lifestyle modifications  -S/p removal of cortisol hypersecretion may help with weight loss.    Discussed with urology team as well as medicine Dr. Baker.    Adia Conroy MD  Division of Endocrinology  Pager: [phone]    If after 6PM or before 9AM, or on weekends/holidays, please call endocrine answering service for assistance ([phone]).  For nonurgent matters email [email or web address] for assistance. 65 yo female with PMhx of HTN and Cushing's and R adrenal Mass who is s/p R adrenalectomy.    Problem 1: Cushing's s/p R adrenalectomy  Elevated outpatient serum cortisol 2.3 after dex suppression test as well as elevated urine and salivary cortisol. ACTH levels <1.5.   - Started 50mg IV hydrocortisone q8hrs postop, then tapered to 50mg IV q12h  -Patient still reporting pain but is otherwise hemodynamically stable and improving.  -Currently on hydrocortisone 25mg PO daily at 8AM and 3PM   -Discharge dosing: hydrocortisone 20mg at 8AM and 10mg at 3PM.  -Patient reports she wakes up early at 4-5 AM - explained ok to take morning dose at home ealier at around 6-7 AM.  -Reviewed sick day management to double dose if sick or unwell/stressed.  Prescribe home BP monitor at discharge  Reviewed signs and symptoms of adrenal insufficiency and what to monitor for at home.  - Patient will f/u with Dr Quintana as outpt      Problem 2: R adrenal Mass s/p R adrenalectomy  MRI completed in 05/2021 which showed a 4.0 X 3.6cm R adrenal lesion with atypical features.   - Recommendations as noted above  - F/u surgical pathology as outpatient    Problem 3: Hypertension  - Hold home BP medication (diltiazem) at this time  BP not elevated. Reviewed not to resume at home unless instructed by her doctor for rising BP.    Problem 4: Obesity  - Encourage lifestyle modifications  -S/p removal of cortisol hypersecretion may help with weight loss.

## 2021-06-21 NOTE — PROGRESS NOTE ADULT - ASSESSMENT
66F POD2 s/p R lap adrenalectomy.  Endo following and providing recs for steroid supplementation   POD#3 - doing well; ml reg diet; passing gas; ambulating  Plan:  - home today with endo recs

## 2021-06-22 ENCOUNTER — APPOINTMENT (OUTPATIENT)
Dept: DISASTER EMERGENCY | Facility: CLINIC | Age: 66
End: 2021-06-22

## 2021-06-23 RX ORDER — DEXAMETHASONE 1 MG/1
1 TABLET ORAL
Qty: 1 | Refills: 0 | Status: COMPLETED | COMMUNITY
Start: 2021-04-22 | End: 2021-06-23

## 2021-06-28 LAB — SURGICAL PATHOLOGY STUDY: SIGNIFICANT CHANGE UP

## 2021-06-29 RX ORDER — HYDROCORTISONE 20 MG/1
20 TABLET ORAL
Qty: 30 | Refills: 3 | Status: COMPLETED | COMMUNITY
Start: 2021-06-23 | End: 2021-06-29

## 2021-06-29 RX ORDER — DILTIAZEM HYDROCHLORIDE 120 MG/1
120 CAPSULE, EXTENDED RELEASE ORAL
Qty: 180 | Refills: 0 | Status: COMPLETED | COMMUNITY
Start: 2021-03-31 | End: 2021-06-29

## 2021-06-29 RX ORDER — HYDROCORTISONE 10 MG/1
10 TABLET ORAL
Qty: 30 | Refills: 3 | Status: COMPLETED | COMMUNITY
Start: 2021-06-23 | End: 2021-06-29

## 2021-07-01 ENCOUNTER — APPOINTMENT (OUTPATIENT)
Dept: ENDOCRINOLOGY | Facility: CLINIC | Age: 66
End: 2021-07-01
Payer: MEDICARE

## 2021-07-01 VITALS
BODY MASS INDEX: 36.8 KG/M2 | WEIGHT: 200 LBS | SYSTOLIC BLOOD PRESSURE: 133 MMHG | OXYGEN SATURATION: 95 % | DIASTOLIC BLOOD PRESSURE: 82 MMHG | TEMPERATURE: 98.2 F | HEART RATE: 101 BPM | HEIGHT: 62 IN

## 2021-07-01 PROBLEM — I10 ESSENTIAL (PRIMARY) HYPERTENSION: Chronic | Status: ACTIVE | Noted: 2021-06-11

## 2021-07-01 PROBLEM — E66.9 OBESITY, UNSPECIFIED: Chronic | Status: ACTIVE | Noted: 2021-06-11

## 2021-07-01 PROCEDURE — 99214 OFFICE O/P EST MOD 30 MIN: CPT

## 2021-07-07 NOTE — ASSESSMENT
[FreeTextEntry1] : 66 yr old F with HTN, hiatal hernia with R adrenal mass s/p adrenalectomy and subclinical Cushings\par R adrenal mass s/p adrenalectomy \par started on hydrocortisone 20/10 recently lowered to 10/5\par Cont home BP monitoring\par Will monitor electrolytes \par Sick day rules discussed\par \par Obesity:\par Counselled on diet and exercise modification and importance of weight loss\par \par \par

## 2021-07-07 NOTE — HISTORY OF PRESENT ILLNESS
[FreeTextEntry1] : 66 yr F with hiatal hernia, HTN f/u R adrenal mass and subclinical Cushings\par CT Apr 2021: 3.9 X 3.7 cm mass with eccentric calcification which may represent adenoma vs myolipoma\par \par No history of uncontrolled HTN, does have LE swelling\par No Hx of palpitations diaphoresis, headaches in episodic spells\par Has some central obesity but no easy bruising or bleeding, mood changes, proximal muscle weakness\par She does not have a history of thyroid disorder\par No hair growth, acne or change in voice\par Her current meds include: cardizem, prilosec, sucralfate, Vit C, D, glucosamine\par \par She is s/p R adrenalectomy on June 18th with path consistent with adrenal adenoma

## 2021-07-08 ENCOUNTER — NON-APPOINTMENT (OUTPATIENT)
Age: 66
End: 2021-07-08

## 2021-07-14 LAB — CORTIS SERPL-MCNC: 11.5 UG/DL

## 2021-07-29 ENCOUNTER — APPOINTMENT (OUTPATIENT)
Dept: UROLOGY | Facility: CLINIC | Age: 66
End: 2021-07-29
Payer: MEDICARE

## 2021-07-29 VITALS
HEART RATE: 101 BPM | RESPIRATION RATE: 16 BRPM | TEMPERATURE: 97.5 F | DIASTOLIC BLOOD PRESSURE: 80 MMHG | SYSTOLIC BLOOD PRESSURE: 122 MMHG

## 2021-07-29 DIAGNOSIS — E27.8 OTHER SPECIFIED DISORDERS OF ADRENAL GLAND: ICD-10-CM

## 2021-07-29 PROCEDURE — 99024 POSTOP FOLLOW-UP VISIT: CPT

## 2021-07-29 RX ORDER — DILTIAZEM HYDROCHLORIDE 120 MG/1
120 CAPSULE, COATED, EXTENDED RELEASE ORAL
Refills: 0 | Status: ACTIVE | COMMUNITY

## 2021-07-29 RX ORDER — HYDROCORTISONE 10 MG/1
10 TABLET ORAL
Qty: 30 | Refills: 3 | Status: COMPLETED | COMMUNITY
Start: 2021-06-29 | End: 2021-07-29

## 2021-07-29 RX ORDER — HYDROCORTISONE 5 MG/1
5 TABLET ORAL
Qty: 30 | Refills: 3 | Status: COMPLETED | COMMUNITY
Start: 2021-06-29 | End: 2021-07-29

## 2021-08-05 NOTE — ASSESSMENT
[FreeTextEntry1] : Doing well.  Pathology results reviewed.\par Recommend continue follow-up with endocrinology.

## 2021-08-05 NOTE — HISTORY OF PRESENT ILLNESS
[FreeTextEntry1] : S/p right laparoscopic adrenalectomy 6/18/2021\par Path: 5 cm adrenal adenoma.\par No postoperative complications.\par \par Patient feels well.  Incisions are well-healed.  Has returned to her normal activities.  Currently is off oral steroids.  Her blood pressure remained stable.  No abdominal pain or flank pain.  Normal voiding.

## 2021-09-02 ENCOUNTER — APPOINTMENT (OUTPATIENT)
Dept: ENDOCRINOLOGY | Facility: CLINIC | Age: 66
End: 2021-09-02
Payer: MEDICARE

## 2021-09-02 VITALS
WEIGHT: 196 LBS | HEIGHT: 62 IN | OXYGEN SATURATION: 96 % | BODY MASS INDEX: 36.07 KG/M2 | HEART RATE: 102 BPM | SYSTOLIC BLOOD PRESSURE: 116 MMHG | DIASTOLIC BLOOD PRESSURE: 76 MMHG

## 2021-09-02 DIAGNOSIS — E24.9 CUSHING'S SYNDROME, UNSPECIFIED: ICD-10-CM

## 2021-09-02 PROCEDURE — 99214 OFFICE O/P EST MOD 30 MIN: CPT

## 2021-09-06 PROBLEM — E24.9 ADRENAL CUSHING'S SYNDROME: Status: ACTIVE | Noted: 2021-07-29

## 2021-09-06 NOTE — ASSESSMENT
[FreeTextEntry1] : 66 yr old F with HTN, hiatal hernia with R adrenal mass and subclinical Cushings s/p adrenalectomy\par R adrenal mass s/p adrenalectomy \par Remain off hydrocortisone\par Cont home BP monitoring\par Will monitor electrolytes \par \par \par Obesity:\par Counselled on diet and exercise modification and importance of weight loss\par \par \par

## 2021-09-06 NOTE — HISTORY OF PRESENT ILLNESS
[FreeTextEntry1] : 66 yr F with hiatal hernia, HTN f/u R adrenal mass and subclinical Cushings\par CT Apr 2021: 3.9 X 3.7 cm mass with eccentric calcification which may represent adenoma vs myolipoma\par \par No history of uncontrolled HTN, does have LE swelling\par No Hx of palpitations diaphoresis, headaches in episodic spells\par Has some central obesity but no easy bruising or bleeding, mood changes, proximal muscle weakness\par She does not have a history of thyroid disorder\par No hair growth, acne or change in voice\par Her current meds include: cardizem, prilosec, sucralfate, Vit C, D, glucosamine\par \par She is s/p R adrenalectomy on June 18th with path consistent with adrenal adenoma\par Post surgery she was maintained on hydrocortisone which discontinued in July 2021\par Today she denies dizziness, palpitations, nausea or vomiting\par She is being followed by cardiology

## 2021-09-06 NOTE — PHYSICAL EXAM
[Alert] : alert [No Acute Distress] : no acute distress [Normal Sclera/Conjunctiva] : normal sclera/conjunctiva [No Proptosis] : no proptosis [Normal Outer Ear/Nose] : the ears and nose were normal in appearance [Normal Hearing] : hearing was normal [No Respiratory Distress] : no respiratory distress [Clear to Auscultation] : lungs were clear to auscultation bilaterally [Normal Bowel Sounds] : normal bowel sounds [Soft] : abdomen soft [Normal Gait] : normal gait [No Joint Swelling] : no joint swelling seen [No Rash] : no rash [Cranial Nerves Intact] : cranial nerves 2-12 were intact [No Tremors] : no tremors [Oriented x3] : oriented to person, place, and time [Normal Affect] : the affect was normal [Normal Mood] : the mood was normal [Normal S1, S2] : normal S1 and S2 [Normal Rate] : heart rate was normal [Right Foot Was Examined] : right foot ~C was examined [Left Foot Was Examined] : left foot ~C was examined [Normal] : normal [2+] : 2+ in the dorsalis pedis [Foot Ulcers] : no foot ulcers [Tenderness] : not tender [Vibration Dec.] : normal vibratory sensation at the level of the toes [Position Sense Dec.] : normal position sense at the level of the toes [Diminished Throughout Both Feet] : normal tactile sensation with monofilament testing throughout both feet

## 2021-09-16 LAB
25(OH)D3 SERPL-MCNC: 33.5 NG/ML
ALBUMIN SERPL ELPH-MCNC: 4.3 G/DL
ALP BLD-CCNC: 108 U/L
ALT SERPL-CCNC: 35 U/L
ANION GAP SERPL CALC-SCNC: 19 MMOL/L
AST SERPL-CCNC: 22 U/L
BILIRUB SERPL-MCNC: 0.5 MG/DL
BUN SERPL-MCNC: 23 MG/DL
CALCIUM SERPL-MCNC: 9.6 MG/DL
CHLORIDE SERPL-SCNC: 106 MMOL/L
CO2 SERPL-SCNC: 20 MMOL/L
CREAT SERPL-MCNC: 0.69 MG/DL
ESTIMATED AVERAGE GLUCOSE: 120 MG/DL
GLUCOSE SERPL-MCNC: 115 MG/DL
HBA1C MFR BLD HPLC: 5.8 %
POTASSIUM SERPL-SCNC: 4.4 MMOL/L
PROT SERPL-MCNC: 7.2 G/DL
SODIUM SERPL-SCNC: 144 MMOL/L

## 2021-12-23 NOTE — H&P PST ADULT - MUSCULOSKELETAL
Fluid deficit 50 mL    details… ROM intact/no joint swelling/no joint erythema/no joint warmth/no calf tenderness/normal strength detailed exam

## 2022-01-13 ENCOUNTER — APPOINTMENT (OUTPATIENT)
Dept: ENDOCRINOLOGY | Facility: CLINIC | Age: 67
End: 2022-01-13
Payer: MEDICARE

## 2022-01-13 VITALS
DIASTOLIC BLOOD PRESSURE: 79 MMHG | HEIGHT: 62 IN | HEART RATE: 84 BPM | RESPIRATION RATE: 18 BRPM | SYSTOLIC BLOOD PRESSURE: 123 MMHG | OXYGEN SATURATION: 94 % | TEMPERATURE: 98.1 F

## 2022-01-13 DIAGNOSIS — D35.00 BENIGN NEOPLASM OF UNSPECIFIED ADRENAL GLAND: ICD-10-CM

## 2022-01-13 DIAGNOSIS — E66.9 OBESITY, UNSPECIFIED: ICD-10-CM

## 2022-01-13 PROCEDURE — 99213 OFFICE O/P EST LOW 20 MIN: CPT

## 2022-01-20 PROBLEM — D35.00 ADRENAL ADENOMA: Status: ACTIVE | Noted: 2021-04-22

## 2022-01-20 PROBLEM — E66.9 OBESITY: Status: ACTIVE | Noted: 2021-04-25

## 2022-01-20 NOTE — HISTORY OF PRESENT ILLNESS
[FreeTextEntry1] : 66 yr F with hiatal hernia, HTN f/u R adrenal mass and subclinical Cushings\par CT Apr 2021: 3.9 X 3.7 cm mass with eccentric calcification which may represent adenoma vs myolipoma\par \par No history of uncontrolled HTN, does have LE swelling\par No Hx of palpitations diaphoresis, headaches in episodic spells\par Has some central obesity but no easy bruising or bleeding, mood changes, proximal muscle weakness\par She does not have a history of thyroid disorder\par No hair growth, acne or change in voice\par Her current meds include: cardizem, prilosec, sucralfate, Vit C, D, glucosamine\par \par She is s/p R adrenalectomy on June 18th with path consistent with adrenal adenoma\par Post surgery she was maintained on hydrocortisone which was discontinued in July 2021\par Today she denies dizziness, palpitations, nausea or vomiting\par She is being followed by cardiology

## 2022-01-20 NOTE — PHYSICAL EXAM
[Alert] : alert [No Acute Distress] : no acute distress [Normal Sclera/Conjunctiva] : normal sclera/conjunctiva [No Proptosis] : no proptosis [Normal Outer Ear/Nose] : the ears and nose were normal in appearance [Normal Hearing] : hearing was normal [No Respiratory Distress] : no respiratory distress [Clear to Auscultation] : lungs were clear to auscultation bilaterally [Normal S1, S2] : normal S1 and S2 [Normal Rate] : heart rate was normal [Normal Bowel Sounds] : normal bowel sounds [Soft] : abdomen soft [Normal Gait] : normal gait [No Joint Swelling] : no joint swelling seen [No Rash] : no rash [Normal] : normal [2+] : 2+ in the dorsalis pedis [Cranial Nerves Intact] : cranial nerves 2-12 were intact [No Tremors] : no tremors [Oriented x3] : oriented to person, place, and time [Normal Affect] : the affect was normal [Normal Mood] : the mood was normal [Foot Ulcers] : no foot ulcers [Tenderness] : not tender [Vibration Dec.] : normal vibratory sensation at the level of the toes [Position Sense Dec.] : normal position sense at the level of the toes [Diminished Throughout Both Feet] : normal tactile sensation with monofilament testing throughout both feet

## 2022-02-03 LAB
ALBUMIN SERPL ELPH-MCNC: 4.2 G/DL
ALP BLD-CCNC: 110 U/L
ALT SERPL-CCNC: 54 U/L
ANION GAP SERPL CALC-SCNC: 18 MMOL/L
AST SERPL-CCNC: 29 U/L
BILIRUB SERPL-MCNC: 0.6 MG/DL
BUN SERPL-MCNC: 17 MG/DL
CALCIUM SERPL-MCNC: 9.5 MG/DL
CHLORIDE SERPL-SCNC: 106 MMOL/L
CO2 SERPL-SCNC: 21 MMOL/L
CREAT SERPL-MCNC: 0.73 MG/DL
ESTIMATED AVERAGE GLUCOSE: 120 MG/DL
GLUCOSE SERPL-MCNC: 99 MG/DL
HBA1C MFR BLD HPLC: 5.8 %
POTASSIUM SERPL-SCNC: 4.8 MMOL/L
PROT SERPL-MCNC: 7.3 G/DL
SODIUM SERPL-SCNC: 145 MMOL/L

## 2022-03-23 NOTE — ASU PREOP CHECKLIST - VIA
ambulate Terbinafine Counseling: Patient counseling regarding adverse effects of terbinafine including but not limited to headache, diarrhea, rash, upset stomach, liver function test abnormalities, itching, taste/smell disturbance, nausea, abdominal pain, and flatulence.  There is a rare possibility of liver failure that can occur when taking terbinafine.  The patient understands that a baseline LFT and kidney function test may be required. The patient verbalized understanding of the proper use and possible adverse effects of terbinafine.  All of the patient's questions and concerns were addressed.

## 2022-06-16 ENCOUNTER — APPOINTMENT (OUTPATIENT)
Dept: ENDOCRINOLOGY | Facility: CLINIC | Age: 67
End: 2022-06-16

## 2022-06-29 ENCOUNTER — NON-APPOINTMENT (OUTPATIENT)
Age: 67
End: 2022-06-29

## 2022-06-30 ENCOUNTER — APPOINTMENT (OUTPATIENT)
Dept: ORTHOPEDIC SURGERY | Facility: CLINIC | Age: 67
End: 2022-06-30

## 2022-06-30 VITALS
HEIGHT: 62 IN | WEIGHT: 196 LBS | SYSTOLIC BLOOD PRESSURE: 120 MMHG | HEART RATE: 72 BPM | BODY MASS INDEX: 36.07 KG/M2 | DIASTOLIC BLOOD PRESSURE: 75 MMHG

## 2022-06-30 DIAGNOSIS — M17.12 UNILATERAL PRIMARY OSTEOARTHRITIS, LEFT KNEE: ICD-10-CM

## 2022-06-30 DIAGNOSIS — M25.561 PAIN IN RIGHT KNEE: ICD-10-CM

## 2022-06-30 DIAGNOSIS — M25.562 PAIN IN RIGHT KNEE: ICD-10-CM

## 2022-06-30 PROCEDURE — 73564 X-RAY EXAM KNEE 4 OR MORE: CPT | Mod: 50

## 2022-06-30 PROCEDURE — 99205 OFFICE O/P NEW HI 60 MIN: CPT

## 2022-06-30 NOTE — CONSULT LETTER
[Dear  ___] : Dear  [unfilled], [Consult Letter:] : I had the pleasure of evaluating your patient, [unfilled]. [Please see my note below.] : Please see my note below. [Consult Closing:] : Thank you very much for allowing me to participate in the care of this patient.  If you have any questions, please do not hesitate to contact me. [Sincerely,] : Sincerely, [FreeTextEntry2] : MADISON ANTONY MD\par  [FreeTextEntry3] : Ian Delarosa MD\par Chief of Joint Replacement\par [facility] \par [facility] [place]\par \par

## 2022-06-30 NOTE — HISTORY OF PRESENT ILLNESS
[de-identified] : Ms. ERNST RAE is a 67 year old female presenting for evaluation of bilateral knee pain, left worse than right.  Patient states her knee pain is going on for years but is now worsening.  Knee pain is localized medially and is worse with weightbearing to be including walking long distance rising from a seated position.  She denies any buckling or locking.  In the past she was diagnosed osteoarthritis and treated conservatively thus far.  She had cortisone and gel injections in the left knee only, the most recent shot being a gel shot in October 2021.  Patient has also tried physical therapy without relief.  He takes anti-inflammatories and Tylenol without significant improvement.  She is interested in discussing left total knee replacement but is currently in the process of having dental work done.\par Past medical history includes hiatal hernia with GERD

## 2022-06-30 NOTE — ADDENDUM
[FreeTextEntry1] : This note was authored by Vladimir Grullon working as a medical scribe for Dr. Ian Delarosa. The note was reviewed, edited, and revised by Dr. Ian Delarosa whom is in agreement with the exam findings, imaging findings, and treatment plan. 06/30/2022

## 2022-06-30 NOTE — PHYSICAL EXAM
[de-identified] : The patient appears well nourished  and in no apparent distress.  The patient is alert and oriented to person, place, and time.   Affect and mood appear normal. The head is normocephalic and atraumatic.  The eyes reveal normal sclera and extra ocular muscles are intact. The tongue is midline with no apparent lesions.  Skin shows normal turgor with no evidence of eczema or psoriasis.  No respiratory distress noted.  Sensation grossly intact.		  [de-identified] : Exam of the right knee shows 10 degrees of varus which is partially correctable, 3 mm laxity of the LCL with an endpoint, -5 to 125 degrees of flexion measured with a goniometer. \par Exam of the left knee shows 12 degrees of varus which is partially correctable, 3 mm laxity of the LCL with and endpoint, -3 to *124 degrees of flexion measured with a goniometer. \par 5/5 motor strength bilaterally distally. Sensation intact distally.  [de-identified] : X-ray: 4 views of the left knee demonstrate bone on bone varus arthritis.		 		 \par X-ray: 4 views of the right knee demonstrate bone on bone varus arthritis.

## 2022-06-30 NOTE — DISCUSSION/SUMMARY
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 Based upon the patients continued symptoms and failure to respond to conservative treatment I have recommended a left total knee replacement for the patient.  A discussion was had with the patient regarding a left total knee replacement. A long discussion was had with the patient as what the total joint replacement would entail. A model was used to demonstrate the operation and to discuss bearing surfaces of the implants. The hospitalization and rehabilitation were discussed.  The use of perioperative antibiotics and DVT prophylaxis were discussed. The risks, benefits and alternatives to surgical intervention were discussed at length with the patient. Specific risks discussed included: infection, wound breakdown, numbness and damage to nerves, tendon, muscle, arteries or other blood vessels. The possibility of recurrent pain, no improvement in pain and actual worsening of the pain were also mentioned in conversation with the patient. Medical complications related to the patient's general medical health including deep vein thrombosis, pulmonary embolus, heart attack, stroke, death and other complications from anesthesia were discussed as well. The patient was told that we will take steps to minimize these risks by using sterile technique, antibiotics and DVT prophylaxis when appropriate and following the patient postoperatively in the clinic setting to monitor progress. The benefits of surgery were discussed with the patient including the potential to improve the current clinical condition through operative intervention. Alternatives to surgical intervention include continued conservative management which may yield less than optimal results in this particular patient. All questions were answered to the satisfaction of the patient. Models were used as an educational tool. We did discuss implant choices and fixation, with shared decision making with the patient.		 \par \par We discussed that the knee replacement will be done with robotic assistance to enhance accuracy and dynamic joint balancing.		 \par \par We plan to stage her right TKR after her left TKR.

## 2022-08-18 ENCOUNTER — APPOINTMENT (OUTPATIENT)
Dept: CT IMAGING | Facility: CLINIC | Age: 67
End: 2022-08-18

## 2022-08-18 PROCEDURE — 73700 CT LOWER EXTREMITY W/O DYE: CPT | Mod: LT

## 2022-09-09 ENCOUNTER — OUTPATIENT (OUTPATIENT)
Dept: OUTPATIENT SERVICES | Facility: HOSPITAL | Age: 67
LOS: 1 days | End: 2022-09-09
Payer: MEDICARE

## 2022-09-09 VITALS
RESPIRATION RATE: 16 BRPM | DIASTOLIC BLOOD PRESSURE: 81 MMHG | SYSTOLIC BLOOD PRESSURE: 122 MMHG | OXYGEN SATURATION: 98 % | TEMPERATURE: 98 F | HEIGHT: 62 IN | WEIGHT: 189.16 LBS | HEART RATE: 72 BPM

## 2022-09-09 DIAGNOSIS — D49.7 NEOPLASM OF UNSPECIFIED BEHAVIOR OF ENDOCRINE GLANDS AND OTHER PARTS OF NERVOUS SYSTEM: ICD-10-CM

## 2022-09-09 DIAGNOSIS — Z98.49 CATARACT EXTRACTION STATUS, UNSPECIFIED EYE: Chronic | ICD-10-CM

## 2022-09-09 DIAGNOSIS — Z98.890 OTHER SPECIFIED POSTPROCEDURAL STATES: Chronic | ICD-10-CM

## 2022-09-09 DIAGNOSIS — Z87.19 PERSONAL HISTORY OF OTHER DISEASES OF THE DIGESTIVE SYSTEM: ICD-10-CM

## 2022-09-09 DIAGNOSIS — Z29.9 ENCOUNTER FOR PROPHYLACTIC MEASURES, UNSPECIFIED: ICD-10-CM

## 2022-09-09 DIAGNOSIS — I10 ESSENTIAL (PRIMARY) HYPERTENSION: ICD-10-CM

## 2022-09-09 DIAGNOSIS — M17.12 UNILATERAL PRIMARY OSTEOARTHRITIS, LEFT KNEE: ICD-10-CM

## 2022-09-09 DIAGNOSIS — K21.9 GASTRO-ESOPHAGEAL REFLUX DISEASE WITHOUT ESOPHAGITIS: ICD-10-CM

## 2022-09-09 DIAGNOSIS — Z90.49 ACQUIRED ABSENCE OF OTHER SPECIFIED PARTS OF DIGESTIVE TRACT: Chronic | ICD-10-CM

## 2022-09-09 DIAGNOSIS — Z01.818 ENCOUNTER FOR OTHER PREPROCEDURAL EXAMINATION: ICD-10-CM

## 2022-09-09 LAB
A1C WITH ESTIMATED AVERAGE GLUCOSE RESULT: 5.4 % — SIGNIFICANT CHANGE UP (ref 4–5.6)
ANION GAP SERPL CALC-SCNC: 13 MMOL/L — SIGNIFICANT CHANGE UP (ref 5–17)
APTT BLD: 32.1 SEC — SIGNIFICANT CHANGE UP (ref 27.5–35.5)
BLD GP AB SCN SERPL QL: SIGNIFICANT CHANGE UP
BUN SERPL-MCNC: 18.6 MG/DL — SIGNIFICANT CHANGE UP (ref 8–20)
CALCIUM SERPL-MCNC: 9.4 MG/DL — SIGNIFICANT CHANGE UP (ref 8.4–10.5)
CHLORIDE SERPL-SCNC: 105 MMOL/L — SIGNIFICANT CHANGE UP (ref 98–107)
CO2 SERPL-SCNC: 25 MMOL/L — SIGNIFICANT CHANGE UP (ref 22–29)
CREAT SERPL-MCNC: 0.67 MG/DL — SIGNIFICANT CHANGE UP (ref 0.5–1.3)
EGFR: 95 ML/MIN/1.73M2 — SIGNIFICANT CHANGE UP
ESTIMATED AVERAGE GLUCOSE: 108 MG/DL — SIGNIFICANT CHANGE UP (ref 68–114)
GLUCOSE SERPL-MCNC: 90 MG/DL — SIGNIFICANT CHANGE UP (ref 70–99)
HCT VFR BLD CALC: 43.9 % — SIGNIFICANT CHANGE UP (ref 34.5–45)
HGB BLD-MCNC: 15 G/DL — SIGNIFICANT CHANGE UP (ref 11.5–15.5)
INR BLD: 0.98 RATIO — SIGNIFICANT CHANGE UP (ref 0.88–1.16)
MCHC RBC-ENTMCNC: 29.6 PG — SIGNIFICANT CHANGE UP (ref 27–34)
MCHC RBC-ENTMCNC: 34.2 GM/DL — SIGNIFICANT CHANGE UP (ref 32–36)
MCV RBC AUTO: 86.6 FL — SIGNIFICANT CHANGE UP (ref 80–100)
PLATELET # BLD AUTO: 360 K/UL — SIGNIFICANT CHANGE UP (ref 150–400)
POTASSIUM SERPL-MCNC: 4.3 MMOL/L — SIGNIFICANT CHANGE UP (ref 3.5–5.3)
POTASSIUM SERPL-SCNC: 4.3 MMOL/L — SIGNIFICANT CHANGE UP (ref 3.5–5.3)
PROTHROM AB SERPL-ACNC: 11.4 SEC — SIGNIFICANT CHANGE UP (ref 10.5–13.4)
RBC # BLD: 5.07 M/UL — SIGNIFICANT CHANGE UP (ref 3.8–5.2)
RBC # FLD: 14 % — SIGNIFICANT CHANGE UP (ref 10.3–14.5)
SARS-COV-2 RNA SPEC QL NAA+PROBE: SIGNIFICANT CHANGE UP
SODIUM SERPL-SCNC: 143 MMOL/L — SIGNIFICANT CHANGE UP (ref 135–145)
WBC # BLD: 7.47 K/UL — SIGNIFICANT CHANGE UP (ref 3.8–10.5)
WBC # FLD AUTO: 7.47 K/UL — SIGNIFICANT CHANGE UP (ref 3.8–10.5)

## 2022-09-09 PROCEDURE — 93010 ELECTROCARDIOGRAM REPORT: CPT

## 2022-09-09 PROCEDURE — 93005 ELECTROCARDIOGRAM TRACING: CPT

## 2022-09-09 PROCEDURE — G0463: CPT

## 2022-09-09 RX ORDER — ZINC SULFATE TAB 220 MG (50 MG ZINC EQUIVALENT) 220 (50 ZN) MG
1 TAB ORAL
Qty: 0 | Refills: 0 | DISCHARGE

## 2022-09-09 RX ORDER — ASCORBIC ACID 60 MG
2 TABLET,CHEWABLE ORAL
Qty: 0 | Refills: 0 | DISCHARGE

## 2022-09-09 RX ORDER — OMEGA-3 ACID ETHYL ESTERS 1 G
1 CAPSULE ORAL
Qty: 0 | Refills: 0 | DISCHARGE

## 2022-09-09 RX ORDER — GLUCOSAMINE HCL/CHONDROITIN SU 500-400 MG
1 CAPSULE ORAL
Qty: 0 | Refills: 0 | DISCHARGE

## 2022-09-09 RX ORDER — OMEPRAZOLE 10 MG/1
1 CAPSULE, DELAYED RELEASE ORAL
Qty: 0 | Refills: 0 | DISCHARGE

## 2022-09-09 NOTE — H&P PST ADULT - NSICDXPASTSURGICALHX_GEN_ALL_CORE_FT
PAST SURGICAL HISTORY:  H/O breast biopsy     H/O excision of mass benign mass from forehead    S/P anal fissurectomy     S/P cataract surgery     S/P cholecystectomy      PAST SURGICAL HISTORY:  H/O breast biopsy     H/O excision of mass benign mass from forehead    History of adrenal surgery 2021    S/P anal fissurectomy     S/P cataract surgery     S/P cholecystectomy

## 2022-09-09 NOTE — H&P PST ADULT - PROBLEM SELECTOR PLAN 6
Left Total knee replacement with Marty Robot by Dr. Delarosa on 9/28/22. Covid vaccine series completed, card in chart, (pfizer X 3) she tested positive on 8/14/22, recovered well but she does not have a copy of the results, so will repeat the test today. Medical and cardiac clearance pending

## 2022-09-09 NOTE — H&P PST ADULT - ASSESSMENT
67 year old female with left knee pain for the last 2-3 years which got progressively got worse, 7/10 pain with walking and standing, she had a Gel and steroid injection in the past which didn't help much, she said its bone on bone now and its affecting her quality of life, she is scheduled for a Left Total knee replacement with Marty Robot by Dr. Delarosa on 9/28/22. Covid vaccine series completed, card in chart, (pfizer X 3) she tested positive on 8/14/22, recovered well but she does not have a copy of the results, so will repeat the test today. Medical and cardiac clearance pending    67 year old female with left knee pain for the last 2-3 years which got progressively got worse, 7/10 pain with walking and standing, she had a Gel and steroid injection in the past which didn't help much, she said its bone on bone now and its affecting her quality of life, she is scheduled for a Left Total knee replacement with Marty Robot by Dr. Delarosa on 22. Covid vaccine series completed, card in chart, (pfizer X 3) she tested positive on 22, recovered well but she does not have a copy of the results, so will repeat the test today. Medical and cardiac clearance pending, had a right adrenalectomy recently, had cushing in the past, scheduled for a cortisol level check soon. Endo clearance ordered.    medications reviewed, instructions given on what medications to take and what not to take. Asked the patient to take the Blood pressure medication/ heart medication or any other important meds with a sip of water in the AM of surgery (Diltiazem ) Asked the patient to consult with cardiologist about holding ASA  and stop/Hold it accordingly, failure to do so may result in cancellation or postponement of your surgery, pt  agreed and verbalized understanding. Stop MVI and osteo flex one week prior. All other meds can be continued till the night before surgery. Asked the pt not to take any NSAID's 5-7 days before surgery and told the pt Tylenol is okay to take for pain, pt verbalized understanding. ERP teaching given and the pt verbalized understanding.   CAPRINI VTE 2.0 SCORE [CLOT updated 2019]    AGE RELATED RISK FACTORS                                                       MOBILITY RELATED FACTORS  [ ] Age 41-60 years                                            (1 Point)                    [ ] Bed rest                                                        (1 Point)  [x ] Age: 61-74 years                                           (2 Points)                  [ ] Plaster cast                                                   (2 Points)  [ ] Age= 75 years                                              (3 Points)                    [ ] Bed bound for more than 72 hours                 (2 Points)    DISEASE RELATED RISK FACTORS                                               GENDER SPECIFIC FACTORS  [x] Edema in the lower extremities                       (1 Point)              [ ] Pregnancy                                                     (1 Point)  [ ] Varicose veins                                               (1 Point)                     [ ] Post-partum < 6 weeks                                   (1 Point)             [ x] BMI > 25 Kg/m2                                            (1 Point)                     [ ] Hormonal therapy  or oral contraception          (1 Point)                 [ ] Sepsis (in the previous month)                        (1 Point)               [ ] History of pregnancy complications                 (1 point)  [ ] Pneumonia or serious lung disease                                               [ ] Unexplained or recurrent                     (1 Point)           (in the previous month)                               (1 Point)  [ ] Abnormal pulmonary function test                     (1 Point)                 SURGERY RELATED RISK FACTORS  [ ] Acute myocardial infarction                              (1 Point)               [ ]  Section                                             (1 Point)  [ ] Congestive heart failure (in the previous month)  (1 Point)      [ ] Minor surgery                                                  (1 Point)   [ ] Inflammatory bowel disease                             (1 Point)               [ ] Arthroscopic surgery                                        (2 Points)  [ ] Central venous access                                      (2 Points)                [ ] General surgery lasting more than 45 minutes (2 points)  [ ] Malignancy- Present or previous                   (2 Points)                [ x] Elective arthroplasty                                         (5 points)    [ ] Stroke (in the previous month)                          (5 Points)                                                                                                                                                           HEMATOLOGY RELATED FACTORS                                                 TRAUMA RELATED RISK FACTORS  [ ] Prior episodes of VTE                                     (3 Points)                [ ] Fracture of the hip, pelvis, or leg                       (5 Points)  [ ] Positive family history for VTE                         (3 Points)             [ ] Acute spinal cord injury (in the previous month)  (5 Points)  [ ] Prothrombin 72606 A                                     (3 Points)               [ ] Paralysis  (less than 1 month)                             (5 Points)  [ ] Factor V Leiden                                             (3 Points)                  [ ] Multiple Trauma within 1 month                        (5 Points)  [ ] Lupus anticoagulants                                     (3 Points)                                                           [ ] Anticardiolipin antibodies                               (3 Points)                                                       [ ] High homocysteine in the blood                      (3 Points)                                             [ ] Other congenital or acquired thrombophilia      (3 Points)                                                [ ] Heparin induced thrombocytopenia                  (3 Points)                                     Total Score [    9      ]  OPIOID RISK TOOL    RUPINDER EACH BOX THAT APPLIES AND ADD TOTALS AT THE END    FAMILY HISTORY OF SUBSTANCE ABUSE                 FEMALE         MALE                                                Alcohol                             [  ]1 pt          [  ]3pts                                               Illegal Drugs                     [  ]2 pts        [  ]3pts                                               Rx Drugs                           [  ]4 pts        [  ]4 pts    PERSONAL HISTORY OF SUBSTANCE ABUSE                                                                                          Alcohol                             [  ]3 pts       [  ]3 pts                                               Illegal Drugs                     [  ]4 pts        [  ]4 pts                                               Rx Drugs                           [  ]5 pts        [  ]5 pts    AGE BETWEEN 16-45 YEARS                                      [  ]1 pt         [  ]1 pt    HISTORY OF PREADOLESCENT   SEXUAL ABUSE                                                             [  ]3 pts        [  ]0pts    PSYCHOLOGICAL DISEASE                     ADD, OCD, Bipolar, Schizophrenia        [  ]2 pts         [  ]2 pts                      Depression                                               [  ]1 pt           [  ]1 pt           SCORING TOTAL   (add numbers and type here)              ( 0)                                     A score of 3 or lower indicated LOW risk for future opioid abuse  A score of 4 to 7 indicated moderate risk for future opioid abuse  A score of 8 or higher indicates a high risk for opioid abuse

## 2022-09-09 NOTE — H&P PST ADULT - NSICDXPASTMEDICALHX_GEN_ALL_CORE_FT
PAST MEDICAL HISTORY:  HTN (hypertension)     Obesity      PAST MEDICAL HISTORY:  Adrenal tumor     Chronic GERD     Gastritis     H/O hiatal hernia     HTN (hypertension)     Obesity

## 2022-09-09 NOTE — H&P PST ADULT - PROBLEM SELECTOR PLAN 3
had a right adrenalectomy recently, had cushing in the past, scheduled for a cortisol level check soon. Endo clearance ordered.

## 2022-09-09 NOTE — H&P PST ADULT - ACTIVITY
walking, household chores, climbs stairs walking, household chores, stationary biking 45 minutes a day, walk in pool for 3/4 miles

## 2022-09-09 NOTE — H&P PST ADULT - HISTORY OF PRESENT ILLNESS
67 year old female with left knee pain for the last 2-3 years which got progressively got worse, 7/10 pain with walking and standing, she had a Gel and steroid injection in the past which didn't help much, she said its bone on bone now and its affecting her quality of life, she is scheduled for a Left Total knee replacement with Marty Robot by Dr. Delarosa on 9/28/22. Covid vaccine series completed, card in chart, (pfizer X 3) she tested positive on 8/14/22, recovered well but she does not have a copy of the results, so will repeat the test today. Medical and cardiac clearance pending

## 2022-09-09 NOTE — H&P PST ADULT - OTHER CARE PROVIDERS
Endocrine - Dr. Quintana will see Dr. Vladimir Henry (PMD/Cardio) [phone] for cardiac clearance pending will see Dr. Vladimir Henry (PMD/Cardio) [phone] for cardiac clearance pending, will see Dr. Barreto for Endocrine clearance.

## 2022-09-10 LAB
MRSA PCR RESULT.: SIGNIFICANT CHANGE UP
S AUREUS DNA NOSE QL NAA+PROBE: SIGNIFICANT CHANGE UP

## 2022-09-27 ENCOUNTER — FORM ENCOUNTER (OUTPATIENT)
Age: 67
End: 2022-09-27

## 2022-09-27 ENCOUNTER — TRANSCRIPTION ENCOUNTER (OUTPATIENT)
Age: 67
End: 2022-09-27

## 2022-09-28 ENCOUNTER — TRANSCRIPTION ENCOUNTER (OUTPATIENT)
Age: 67
End: 2022-09-28

## 2022-09-28 ENCOUNTER — OUTPATIENT (OUTPATIENT)
Dept: OUTPATIENT SERVICES | Facility: HOSPITAL | Age: 67
LOS: 1 days | End: 2022-09-28
Payer: MEDICARE

## 2022-09-28 ENCOUNTER — APPOINTMENT (OUTPATIENT)
Dept: ORTHOPEDIC SURGERY | Facility: HOSPITAL | Age: 67
End: 2022-09-28

## 2022-09-28 DIAGNOSIS — Z98.49 CATARACT EXTRACTION STATUS, UNSPECIFIED EYE: Chronic | ICD-10-CM

## 2022-09-28 DIAGNOSIS — Z90.49 ACQUIRED ABSENCE OF OTHER SPECIFIED PARTS OF DIGESTIVE TRACT: Chronic | ICD-10-CM

## 2022-09-28 DIAGNOSIS — Z98.890 OTHER SPECIFIED POSTPROCEDURAL STATES: Chronic | ICD-10-CM

## 2022-09-28 PROCEDURE — 27447 TOTAL KNEE ARTHROPLASTY: CPT | Mod: AS,LT

## 2022-09-28 PROCEDURE — 0055T BONE SRGRY CMPTR CT/MRI IMAG: CPT | Mod: AS,LT

## 2022-09-28 DEVICE — CEMENT BONE PALACOS R W/O GENTAMICIN 1X40: Type: IMPLANTABLE DEVICE | Status: FUNCTIONAL

## 2022-09-28 DEVICE — COMP FEM TRIATHLON CR SZ 3 LT: Type: IMPLANTABLE DEVICE | Status: FUNCTIONAL

## 2022-09-28 DEVICE — PIN BONE SELF TAP 4X80MM STRL: Type: IMPLANTABLE DEVICE | Status: FUNCTIONAL

## 2022-09-28 DEVICE — IMP PATELLA SYMMETRIC X3 31X9MM: Type: IMPLANTABLE DEVICE | Status: FUNCTIONAL

## 2022-09-28 DEVICE — PIN SELF DRILL TP STRL 4X140MM: Type: IMPLANTABLE DEVICE | Status: FUNCTIONAL

## 2022-09-28 DEVICE — INSERT TIB BEARING TRIATHLON CS X3 SZ 3 9MM: Type: IMPLANTABLE DEVICE | Status: FUNCTIONAL

## 2022-09-28 DEVICE — BASEPLATE TIB UNIV TRIATHLON SZ 3: Type: IMPLANTABLE DEVICE | Status: FUNCTIONAL

## 2022-09-28 DEVICE — SCREW PSN FEMALE 2.5X25MM: Type: IMPLANTABLE DEVICE | Status: FUNCTIONAL

## 2022-09-28 RX ORDER — FAMOTIDINE 10 MG/ML
1 INJECTION INTRAVENOUS
Qty: 0 | Refills: 0 | DISCHARGE

## 2022-09-28 RX ORDER — ACETAMINOPHEN 500 MG
2 TABLET ORAL
Qty: 0 | Refills: 0 | DISCHARGE

## 2022-09-28 RX ORDER — SUCRALFATE 1 G
1 TABLET ORAL
Qty: 0 | Refills: 0 | DISCHARGE

## 2022-09-28 RX ORDER — CHOLECALCIFEROL (VITAMIN D3) 125 MCG
1 CAPSULE ORAL
Qty: 0 | Refills: 0 | DISCHARGE

## 2022-09-28 RX ORDER — L.ACIDOPH/B.ANIMALIS/B.LONGUM 15B CELL
1 CAPSULE ORAL
Qty: 0 | Refills: 0 | DISCHARGE

## 2022-09-28 RX ORDER — DILTIAZEM HCL 120 MG
1 CAPSULE, EXT RELEASE 24 HR ORAL
Qty: 0 | Refills: 0 | DISCHARGE

## 2022-09-28 RX ORDER — FENOFIBRATE,MICRONIZED 130 MG
1 CAPSULE ORAL
Qty: 0 | Refills: 0 | DISCHARGE

## 2022-09-28 RX ORDER — GLUCOSAMINE HCL/CHONDROITIN SU 500-400 MG
2 CAPSULE ORAL
Qty: 0 | Refills: 0 | DISCHARGE

## 2022-09-29 PROCEDURE — 36415 COLL VENOUS BLD VENIPUNCTURE: CPT

## 2022-09-29 PROCEDURE — 27447 TOTAL KNEE ARTHROPLASTY: CPT | Mod: LT

## 2022-09-29 PROCEDURE — C1713: CPT

## 2022-09-29 PROCEDURE — 73560 X-RAY EXAM OF KNEE 1 OR 2: CPT

## 2022-09-29 PROCEDURE — S2900: CPT

## 2022-09-29 PROCEDURE — 82962 GLUCOSE BLOOD TEST: CPT

## 2022-09-29 PROCEDURE — C1776: CPT

## 2022-09-29 RX ORDER — ASPIRIN/CALCIUM CARB/MAGNESIUM 324 MG
1 TABLET ORAL
Qty: 0 | Refills: 0 | DISCHARGE

## 2022-10-03 DIAGNOSIS — M17.12 UNILATERAL PRIMARY OSTEOARTHRITIS, LEFT KNEE: ICD-10-CM

## 2022-10-03 PROBLEM — K29.70 GASTRITIS, UNSPECIFIED, WITHOUT BLEEDING: Chronic | Status: ACTIVE | Noted: 2022-09-09

## 2022-10-03 PROBLEM — K21.9 GASTRO-ESOPHAGEAL REFLUX DISEASE WITHOUT ESOPHAGITIS: Chronic | Status: ACTIVE | Noted: 2022-09-09

## 2022-10-03 PROBLEM — Z87.19 PERSONAL HISTORY OF OTHER DISEASES OF THE DIGESTIVE SYSTEM: Chronic | Status: ACTIVE | Noted: 2022-09-09

## 2022-10-03 PROBLEM — D49.7 NEOPLASM OF UNSPECIFIED BEHAVIOR OF ENDOCRINE GLANDS AND OTHER PARTS OF NERVOUS SYSTEM: Chronic | Status: ACTIVE | Noted: 2022-09-09

## 2022-10-17 ENCOUNTER — NON-APPOINTMENT (OUTPATIENT)
Age: 67
End: 2022-10-17

## 2022-10-17 DIAGNOSIS — Z47.1 AFTERCARE FOLLOWING JOINT REPLACEMENT SURGERY: ICD-10-CM

## 2022-10-17 DIAGNOSIS — Z96.652 AFTERCARE FOLLOWING JOINT REPLACEMENT SURGERY: ICD-10-CM

## 2022-10-18 ENCOUNTER — APPOINTMENT (OUTPATIENT)
Dept: ORTHOPEDIC SURGERY | Facility: CLINIC | Age: 67
End: 2022-10-18

## 2022-10-18 VITALS
HEIGHT: 62 IN | BODY MASS INDEX: 36.07 KG/M2 | HEART RATE: 79 BPM | SYSTOLIC BLOOD PRESSURE: 130 MMHG | WEIGHT: 196 LBS | DIASTOLIC BLOOD PRESSURE: 82 MMHG

## 2022-10-18 DIAGNOSIS — Z96.651 AFTERCARE FOLLOWING JOINT REPLACEMENT SURGERY: ICD-10-CM

## 2022-10-18 DIAGNOSIS — Z47.1 AFTERCARE FOLLOWING JOINT REPLACEMENT SURGERY: ICD-10-CM

## 2022-10-18 PROCEDURE — 99024 POSTOP FOLLOW-UP VISIT: CPT

## 2022-10-18 PROCEDURE — 73562 X-RAY EXAM OF KNEE 3: CPT | Mod: LT

## 2022-10-18 NOTE — HISTORY OF PRESENT ILLNESS
[de-identified] : S/P Left robotic assisted TKR with TONYA, DOS: 9/28/22. [de-identified] : ERNST RAE is doing well 3 week s/p left total knee replacement. She is participating in home physical therapy, as well as a home exercise program daily. Her pain level is controlled. She is taking aspirin 325 mg twice a day for DVT prophylaxis. Overall, she is very happy with the results of the surgery so far.		  [de-identified] : Exam of the left knee shows  a well healing incision, 0 to 108 degrees of flexion measured with a goniometer. \par  5/5 motor strength bilaterally distally. Sensation intact distally.		  [de-identified] : X-ray: 3 views of the left knee demonstrate a total knee replacement in good alignment with a well centered patella, without evidence of loosening or subsidence, and no fracture.		  [de-identified] : The patient is doing very well 3 weeks after left TKR. The patient will be transitioned to outpatient physical therapy and a prescription was given for that. The patient will continue aspirin 325 mg twice per day for DVT prophylaxis for the next 3 weeks. The importance of physical therapy and achieving full knee extension as well as progressing knee flexion was reinforced. Overall the patient is very happy with their outcome so far. Followup in 3 weeks with repeat x-rays

## 2022-10-18 NOTE — ADDENDUM
[FreeTextEntry1] : This note was authored by Vladimir Grullon working as a medical scribe for Dr. Ian Delarosa. The note was reviewed, edited, and revised by Dr. Ian Delarosa whom is in agreement with the exam findings, imaging findings, and treatment plan. 10/18/2022

## 2022-11-10 ENCOUNTER — APPOINTMENT (OUTPATIENT)
Dept: ORTHOPEDIC SURGERY | Facility: CLINIC | Age: 67
End: 2022-11-10

## 2022-11-10 VITALS
HEIGHT: 62 IN | HEART RATE: 81 BPM | BODY MASS INDEX: 36.07 KG/M2 | SYSTOLIC BLOOD PRESSURE: 130 MMHG | DIASTOLIC BLOOD PRESSURE: 82 MMHG | WEIGHT: 196 LBS

## 2022-11-10 DIAGNOSIS — Z47.1 AFTERCARE FOLLOWING JOINT REPLACEMENT SURGERY: ICD-10-CM

## 2022-11-10 DIAGNOSIS — Z96.659 PRESENCE OF UNSPECIFIED ARTIFICIAL KNEE JOINT: ICD-10-CM

## 2022-11-10 PROCEDURE — 73562 X-RAY EXAM OF KNEE 3: CPT | Mod: LT

## 2022-11-10 PROCEDURE — 99024 POSTOP FOLLOW-UP VISIT: CPT

## 2022-11-10 NOTE — HISTORY OF PRESENT ILLNESS
[de-identified] : S/P Left robotic assisted TKR with TONYA, DOS: 9/28/22. \par \par  [de-identified] : She  is doing well 6 weeks s/p left total knee replacement. She continues to go to outpatient physical therapy. She is taking Tylenol for pain and pain level is controlled. She is taking aspirin for DVT ppx. Patient denies any fevers chills or constitutional symptoms. Patient denies any falls or Trauma. Overall patient is doing well.  [de-identified] : Exam of the left knee shows a well healed incision, 0 to 120 degrees of flexion measured with a goniometer. There is no pain with range of motion.  5/5 motor strength bilaterally distally. Sensation intact distally.		  [de-identified] : X-ray: 3 views of the left knee demonstrate a total knee replacement in good alignment with a well centered patella, without evidence of loosening or subsidence, and no fracture.		  [de-identified] : The patient is doing very well 6 weeks following left total knee replacement. The patient will continue outpatient physical therapy and gradually progress toward home exercises. Importance of knee flexion and knee extension was reinforced to the patient again. The patient was advised to gradually taper down narcotic pain medication over the next 6 weeks. The patient may stop taking aspirin full-strength at this point and return to preoperative aspirin dosing if applicable. Overall the patient is making excellent progress and is very happy with their result so far. Follow up in 6 weeks for repeat evaluation.

## 2022-11-10 NOTE — ADDENDUM
[FreeTextEntry1] : This note was authored by Vladimir Grullon working as a medical scribe for Dr. Ian Delarosa. The note was reviewed, edited, and revised by Dr. Ian Delarosa whom is in agreement with the exam findings, imaging findings, and treatment plan. 11/10/2022

## 2022-12-16 ENCOUNTER — APPOINTMENT (OUTPATIENT)
Dept: ORTHOPEDIC SURGERY | Facility: CLINIC | Age: 67
End: 2022-12-16

## 2022-12-16 VITALS
HEIGHT: 62 IN | HEART RATE: 88 BPM | DIASTOLIC BLOOD PRESSURE: 80 MMHG | SYSTOLIC BLOOD PRESSURE: 117 MMHG | BODY MASS INDEX: 36.07 KG/M2 | WEIGHT: 196 LBS

## 2022-12-16 PROCEDURE — 99024 POSTOP FOLLOW-UP VISIT: CPT

## 2022-12-16 PROCEDURE — 73562 X-RAY EXAM OF KNEE 3: CPT | Mod: LT

## 2022-12-16 NOTE — HISTORY OF PRESENT ILLNESS
[de-identified] : S/P Left robotic assisted TKR with TONYA, DOS: 9/28/22. \par \par  [de-identified] : ERNST RAE is a 67 year female 10 weeks s/p left TKR. She reports some medial knee pain following a fall at the grocery store, although this pain is not significant. She reports continued outpatient physical therapy. She has returned to daily activities of life without significant pain or discomfort. Overall, she is very happy with the result of the surgery.		  [de-identified] : Exam of the left knee shows a well healed incision, 0 to 125 degrees of flexion measured with a goniometer. There is no pain over the medial patella facet with palpation. There is no varus/valgus instability \par  5/5 motor strength bilaterally distally. Sensation intact distally.		  [de-identified] : X-ray: 3 views of the left knee demonstrate a total knee replacement in good alignment with a well centered patella, without evidence of loosening or subsidence, and no fracture.		  [de-identified] : The patient is doing very well 2.5 months following total knee replacement. On imaging the patient's implants appear stable and well fixed without signs of loosening or fracture. Her fall does not appear to have had any significant clinical effect on her TKR. The patient will continue physical therapy for another 2 weeks then transition to home exercises. Overall  she  is making excellent progress and is very happy with the result so far.  Dental prophylaxis was reviewed. Follow up one year post op for radiographic surveillance.  		  Patient received 1/4 turn to Rt. in bed, NAD + ETT to trach on 4/21, cardiac monitoring, NG, condom cath, Lt UE a-line, Rt. neck triple lumen. CAIR bee, SCDs RASS -5; CAM ICU (Unable to assess)

## 2022-12-16 NOTE — ADDENDUM
[FreeTextEntry1] : This note was authored by Vladimir Grullon working as a medical scribe for Dr. Ian Delarosa. The note was reviewed, edited, and revised by Dr. Ian Delarosa whom is in agreement with the exam findings, imaging findings, and treatment plan. 12/16/2022

## 2022-12-29 ENCOUNTER — TRANSCRIPTION ENCOUNTER (OUTPATIENT)
Age: 67
End: 2022-12-29

## 2023-02-09 RX ORDER — AMOXICILLIN 500 MG/1
500 TABLET, FILM COATED ORAL
Qty: 4 | Refills: 0 | Status: ACTIVE | COMMUNITY
Start: 2023-02-09 | End: 1900-01-01

## 2023-08-25 ENCOUNTER — APPOINTMENT (OUTPATIENT)
Dept: ORTHOPEDIC SURGERY | Facility: CLINIC | Age: 68
End: 2023-08-25
Payer: MEDICARE

## 2023-08-25 VITALS
SYSTOLIC BLOOD PRESSURE: 124 MMHG | HEART RATE: 73 BPM | WEIGHT: 196 LBS | BODY MASS INDEX: 36.07 KG/M2 | DIASTOLIC BLOOD PRESSURE: 74 MMHG | HEIGHT: 62 IN

## 2023-08-25 DIAGNOSIS — Z96.652 PRESENCE OF LEFT ARTIFICIAL KNEE JOINT: ICD-10-CM

## 2023-08-25 DIAGNOSIS — M17.11 UNILATERAL PRIMARY OSTEOARTHRITIS, RIGHT KNEE: ICD-10-CM

## 2023-08-25 PROCEDURE — 73564 X-RAY EXAM KNEE 4 OR MORE: CPT | Mod: RT

## 2023-08-25 PROCEDURE — 20610 DRAIN/INJ JOINT/BURSA W/O US: CPT | Mod: RT

## 2023-08-25 PROCEDURE — 73562 X-RAY EXAM OF KNEE 3: CPT | Mod: LT

## 2023-08-25 PROCEDURE — 99214 OFFICE O/P EST MOD 30 MIN: CPT | Mod: 25

## 2023-08-25 NOTE — HISTORY OF PRESENT ILLNESS
[de-identified] : Ms. ERNST RAE is a 68 year old female presenting for evaluation of right knee pain and for annual follow up status post left TKR 9/28/22. Patient notes the left knee is doing well, she does admit to some continued numbness laterally, however. She has not pain in the knee and is back to all normal activity. Patient has worsening right knee pain localized medially. The pain is worse with all weightbearing activity. She has tried PT and NSAIDs without improvement. he is hoping for a right knee steroid injection.

## 2023-08-25 NOTE — PROCEDURE
[de-identified] : Using sterile technique, 2cc of depomedrol 40mg/ml, 4cc of 1% plain lidocaine, and 2 cc 0.25% marcaine was drawn up into a sterile syringe. The right knee was then sterilely prepped with chlorhexidine. Ethyl chloride spray was used to anesthetize the skin and subQ tissue.  The depomedrol/lidocaine/marcaine mixture was then injected into the knee joint in the anterolateral position. The patient tolerated the procedure well without difficulty. The patient was given instructions on the use of ice and anti-inflammatories post injection site soreness.

## 2023-08-25 NOTE — PHYSICAL EXAM
[de-identified] : The patient appears well nourished and in no apparent distress.  The patient is alert and oriented to person, place, and time.   Affect and mood appear normal. The head is normocephalic and atraumatic.  The eyes reveal normal sclera and extra ocular muscles are intact. The tongue is midline with no apparent lesions.  Skin shows normal turgor with no evidence of eczema or psoriasis.  No respiratory distress noted.  Sensation grossly intact.		  [de-identified] : Exam of the right knee shows a varus alignment which is partially correctable, intact LCL, -2 to 123 degrees of flexion measured with a goniometer.  Exam of the left knee shows a well healed incision, 0 to 126 degrees of flexion measured with a goniometer.  5/5 motor strength bilaterally distally. Sensation intact distally.  [de-identified] : X-ray: 3 views of the left knee demonstrate a total knee replacement in good alignment with a well centered patella, without evidence of loosening or subsidence, and no fracture.		   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

## 2023-08-25 NOTE — ADDENDUM
[FreeTextEntry1] : This note was authored by Vladimir Grullon working as a medical scribe for Dr. Ian Delarosa. The note was reviewed, edited, and revised by Dr. Ian Delarosa whom is in agreement with the exam findings, imaging findings, and treatment plan. 08/25/2023

## 2023-08-25 NOTE — DISCUSSION/SUMMARY
[de-identified] : The patient is doing very well 1 year following total left knee replacement. The patient will continue their home exercises. Overall the patient is very happy with the outcome of the surgery.  Dental prophylaxis was reviewed. Follow up in 3-5 years for radiographic surveillance.   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 While the patient may eventually benefit from right total knee replacement, she wishes to remain nonoperative at this time. As such, the patient received an intraarticular cortisone injection in the right knee in the office today. The patient will follow up 6 weeks post injection.

## 2023-09-21 ASSESSMENT — KOOS JR
TWISING OR PIVOTING ON KNEE: MODERATE
HOW SEVERE IS YOUR KNEE STIFFNESS AFTER FIRST WAKING IN MORNING: MODERATE
IMPORTED KOOS JR SCORE: 12.0
RISING FROM SITTING: MODERATE
IMPORTED LATERALITY: LEFT
IMPORTED FORM: YES
KOOS JR RAW SCORE: 12
GOING UP OR DOWN STAIRS: MODERATE
STRAIGHTENING KNEE FULLY: MILD
BENDING TO THE FLOOR TO PICK UP OBJECT: MILD
STANDING UPRIGHT: MODERATE

## 2024-04-26 ENCOUNTER — APPOINTMENT (OUTPATIENT)
Dept: ORTHOPEDIC SURGERY | Facility: CLINIC | Age: 69
End: 2024-04-26

## 2024-07-29 ENCOUNTER — NON-APPOINTMENT (OUTPATIENT)
Age: 69
End: 2024-07-29

## 2024-09-12 ENCOUNTER — APPOINTMENT (OUTPATIENT)
Dept: ORTHOPEDIC SURGERY | Facility: CLINIC | Age: 69
End: 2024-09-12
Payer: MEDICARE

## 2024-09-12 VITALS
SYSTOLIC BLOOD PRESSURE: 122 MMHG | BODY MASS INDEX: 36.07 KG/M2 | HEIGHT: 62 IN | WEIGHT: 196 LBS | DIASTOLIC BLOOD PRESSURE: 76 MMHG

## 2024-09-12 DIAGNOSIS — Z96.652 PRESENCE OF LEFT ARTIFICIAL KNEE JOINT: ICD-10-CM

## 2024-09-12 DIAGNOSIS — M17.11 UNILATERAL PRIMARY OSTEOARTHRITIS, RIGHT KNEE: ICD-10-CM

## 2024-09-12 PROCEDURE — 73562 X-RAY EXAM OF KNEE 3: CPT | Mod: LT

## 2024-09-12 PROCEDURE — 99214 OFFICE O/P EST MOD 30 MIN: CPT | Mod: 25

## 2024-09-12 PROCEDURE — 20610 DRAIN/INJ JOINT/BURSA W/O US: CPT | Mod: RT

## 2024-09-12 PROCEDURE — 73564 X-RAY EXAM KNEE 4 OR MORE: CPT | Mod: RT

## 2024-09-12 NOTE — PHYSICAL EXAM
[de-identified] :  The patient appears well nourished and in no apparent distress. The patient is alert and oriented to person, place, and time. Affect and mood appear normal. The head is normocephalic and atraumatic. The eyes reveal normal sclera and extra ocular muscles are intact. The tongue is midline with no apparent lesions. Skin shows normal turgor with no evidence of eczema or psoriasis. No respiratory distress noted. Sensation grossly intact. [de-identified] :  Exam of the right knee shows a varus alignment which is partially correctable, 3 mm laxity of the LCL, -10 to 126 degrees of flexion measured with a goniometer. 5/5 motor strength bilaterally distally. Sensation intact distally. 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 X-ray: 3 views of the left knee demonstrate a total knee replacement in good alignment with a well centered patella, without evidence of loosening or subsidence, and no fracture.

## 2024-09-12 NOTE — HISTORY OF PRESENT ILLNESS
[de-identified] : Patient is a 69-year-old female 2 years status post left total knee replacement 9/28/2022.  She states overall her left knee is doing well although she does note some clicking at points.  Her numbness and pain have greatly diminished in his knee.  As for her right knee she has worsening pain localized medially.  Her pain in the right knee is worse with weight bearing activity including walking long distance and rising from a seated position.  She had a cortisone injection in August of last year which worked very well.  She is hopeful another injection today.  She tried physical therapy and anti-inflammatories not relief.

## 2024-09-12 NOTE — PROCEDURE
[de-identified] : Using sterile technique, 2cc of depomedrol 40mg/ml, 4cc of 1% plain lidocaine, and 2 cc 0.25% marcaine was drawn up into a sterile syringe. The right knee was then sterilely prepped with chlorhexidine. Ethyl chloride spray was used to anesthetize the skin and subQ tissue. The depomedrol/lidocaine/marcaine mixture was then injected into the knee joint in the anterolateral position. The patient tolerated the procedure well without difficulty. The patient was given instructions on the use of ice and anti-inflammatories post injection site soreness.

## 2024-09-12 NOTE — DISCUSSION/SUMMARY
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  Nonoperative treatment options for knee arthritis were discussed including antiinflammatories, physical therapy, intraarticular cortisone injections, and hyaluronic acid gel injections.  The patient received a cortisone injection in the right knee in the office today. The patient will follow up 6 weeks post injection.  The patient has a progressive varus deformity with flexion contracture.  We discussed that I would not recommend postponing knee replacement too much longer as it would put her at risk for developing lateral collateral ligament insufficiency and further complicate her knee replacement.  She has 4 vacations scheduled between now and February so she will plan for surgery in the spring.

## 2024-09-12 NOTE — REASON FOR VISIT
[Follow-Up Visit] : a follow-up visit for [Other: ____] : [unfilled] [FreeTextEntry2] : S/P Left robotic assisted TKR with TONYA, DOS: 9/28/22.

## 2024-10-21 NOTE — H&P PST ADULT - VENOUS THROMBOEMBOLISM HISTORY
Vital signs: Reviewed.   General: Well-Appearing, in no acute distress  Head: Atraumatic, normocephalic  Eyes: EOMI; PEARLA; Normal eyelids, conjunctiva, and sclera; no discharge  Neck: Normal appearing; Trachea midline  Oral cavity: Lips, oropharynx without abnormalities. Pain with jaw opening.  Cardiovascular: Regular rate and rhythm, no murmurs rubs or gallops were appreciated. No JVD.  Lungs: Normal rate, rhythm and depth of respirations; no accessory muscle use; no wheezes, rales, or rhonchi, and no evidence of airway compromise  Neuro: AAOx3, moving all 4 extremities, mentating appropriately, pain with light touch to face b/l, symmetric sensation. CN2-12 otherwise intact, no focal neurologic deficits. LLE weakness which is reportedly chronic and 2/2 pain from scoliosis. Normal finger-nose testing and pronator drift.   Derm: w/d/i  Psych: mood and affect appropriate and congruent (0) indicator not present

## 2024-12-23 NOTE — H&P PST ADULT - VENOUS THROMBOEMBOLISM CURRENT STATUS
Continuity of Care Form    Patient Name: Deloris Watts   :  1955  MRN:  [id number]    Admit date:  2024  Discharge date:  24    Code Status Order: DNR-CCA   Advance Directives:   Advance Care Flowsheet Documentation             Admitting Physician:  No admitting provider for patient encounter.  PCP: Johana Weldon MD    Discharging Nurse: billy  Discharging Hospital Unit/Room#: 6K-03/003-A  Discharging Unit Phone Number: [phone]    Emergency Contact:   Extended Emergency Contact Information  Primary Emergency Contact: Tenzin Watts  Address: [address] [place] [id number] [facility]  Home Phone: [phone]  Mobile Phone: [phone]  Relation: Spouse   needed? No  Secondary Emergency Contact: Layla Rose   [facility]  Home Phone: [phone]  Mobile Phone: [phone]  Relation: Child   needed? No    Past Surgical History:  Past Surgical History:   Procedure Laterality Date    CARPAL TUNNEL RELEASE Right     in     COLONOSCOPY  2012    [facility]    CT BIOPSY RENAL  2022    CT BIOPSY RENAL 2022 [facility] CT SCAN    CYSTOSCOPY Left 2023    CYSTOSCOPY, LEFT URETERAL STENT PLACEMENT performed by Edilson Whalen MD at [facility] OR    ENDOSCOPY, COLON, DIAGNOSTIC  unsure    HEMORRHOID SURGERY  unsure    HIP SURGERY Left 2024    Left hemiarthroplasty performed by Virgilio Lezama DO at [facility] OR    HIP SURGERY Right 2024    RIGHT HIP HEMIARTHROPLASTY performed by Ebenezer Mantilla MD at [facility] OR    HUMERUS FRACTURE SURGERY Right     ORIF    HYSTERECTOMY (CERVIX STATUS UNKNOWN)      age 38    LASIK      lasik    NECK SURGERY  18  years ago    cervical spine fusion ; in     OVARY REMOVAL Bilateral     age 38    SINUS SURGERY  10 years ago    SPINE SURGERY N/A 2023    KYPHOPLASTY L2 performed by Nimisha Garza MD at [facility] OR    TUBAL LIGATION      URETER SURGERY N/A 2023    CYSTOSCOPY, LEFT 
(0) indicator not present

## 2025-01-08 ENCOUNTER — NON-APPOINTMENT (OUTPATIENT)
Age: 70
End: 2025-01-08

## 2025-01-09 ENCOUNTER — APPOINTMENT (OUTPATIENT)
Dept: ORTHOPEDIC SURGERY | Facility: CLINIC | Age: 70
End: 2025-01-09
Payer: MEDICARE

## 2025-01-09 VITALS
HEIGHT: 62 IN | BODY MASS INDEX: 36.07 KG/M2 | WEIGHT: 196 LBS | SYSTOLIC BLOOD PRESSURE: 134 MMHG | DIASTOLIC BLOOD PRESSURE: 84 MMHG

## 2025-01-09 DIAGNOSIS — M17.11 UNILATERAL PRIMARY OSTEOARTHRITIS, RIGHT KNEE: ICD-10-CM

## 2025-01-09 PROCEDURE — 73564 X-RAY EXAM KNEE 4 OR MORE: CPT | Mod: RT

## 2025-01-09 PROCEDURE — 99215 OFFICE O/P EST HI 40 MIN: CPT

## 2025-01-13 ENCOUNTER — NON-APPOINTMENT (OUTPATIENT)
Age: 70
End: 2025-01-13

## 2025-02-21 ENCOUNTER — APPOINTMENT (OUTPATIENT)
Dept: ORTHOPEDIC SURGERY | Facility: CLINIC | Age: 70
End: 2025-02-21

## 2025-02-25 ENCOUNTER — APPOINTMENT (OUTPATIENT)
Dept: CT IMAGING | Facility: CLINIC | Age: 70
End: 2025-02-25
Payer: MEDICARE

## 2025-02-25 PROCEDURE — 73700 CT LOWER EXTREMITY W/O DYE: CPT | Mod: RT

## 2025-03-18 ENCOUNTER — NON-APPOINTMENT (OUTPATIENT)
Age: 70
End: 2025-03-18

## 2025-03-19 ENCOUNTER — OUTPATIENT (OUTPATIENT)
Dept: OUTPATIENT SERVICES | Facility: HOSPITAL | Age: 70
LOS: 1 days | End: 2025-03-19
Payer: MEDICARE

## 2025-03-19 VITALS
HEIGHT: 62 IN | DIASTOLIC BLOOD PRESSURE: 70 MMHG | TEMPERATURE: 97 F | WEIGHT: 191.8 LBS | RESPIRATION RATE: 16 BRPM | SYSTOLIC BLOOD PRESSURE: 132 MMHG | HEART RATE: 80 BPM | OXYGEN SATURATION: 97 %

## 2025-03-19 DIAGNOSIS — Z29.9 ENCOUNTER FOR PROPHYLACTIC MEASURES, UNSPECIFIED: ICD-10-CM

## 2025-03-19 DIAGNOSIS — Z98.890 OTHER SPECIFIED POSTPROCEDURAL STATES: Chronic | ICD-10-CM

## 2025-03-19 DIAGNOSIS — Z98.49 CATARACT EXTRACTION STATUS, UNSPECIFIED EYE: Chronic | ICD-10-CM

## 2025-03-19 DIAGNOSIS — I10 ESSENTIAL (PRIMARY) HYPERTENSION: ICD-10-CM

## 2025-03-19 DIAGNOSIS — Z01.818 ENCOUNTER FOR OTHER PREPROCEDURAL EXAMINATION: ICD-10-CM

## 2025-03-19 DIAGNOSIS — M17.11 UNILATERAL PRIMARY OSTEOARTHRITIS, RIGHT KNEE: ICD-10-CM

## 2025-03-19 DIAGNOSIS — Z90.49 ACQUIRED ABSENCE OF OTHER SPECIFIED PARTS OF DIGESTIVE TRACT: Chronic | ICD-10-CM

## 2025-03-19 LAB
A1C WITH ESTIMATED AVERAGE GLUCOSE RESULT: 5.8 % — HIGH (ref 4–5.6)
ANION GAP SERPL CALC-SCNC: 15 MMOL/L — SIGNIFICANT CHANGE UP (ref 5–17)
APTT BLD: 34.1 SEC — SIGNIFICANT CHANGE UP (ref 24.5–35.6)
BASOPHILS # BLD AUTO: 0.08 K/UL — SIGNIFICANT CHANGE UP (ref 0–0.2)
BASOPHILS NFR BLD AUTO: 1.1 % — SIGNIFICANT CHANGE UP (ref 0–2)
BLD GP AB SCN SERPL QL: SIGNIFICANT CHANGE UP
BUN SERPL-MCNC: 17.2 MG/DL — SIGNIFICANT CHANGE UP (ref 8–20)
CALCIUM SERPL-MCNC: 9.5 MG/DL — SIGNIFICANT CHANGE UP (ref 8.4–10.5)
CHLORIDE SERPL-SCNC: 102 MMOL/L — SIGNIFICANT CHANGE UP (ref 96–108)
CO2 SERPL-SCNC: 24 MMOL/L — SIGNIFICANT CHANGE UP (ref 22–29)
CREAT SERPL-MCNC: 0.68 MG/DL — SIGNIFICANT CHANGE UP (ref 0.5–1.3)
EGFR: 93 ML/MIN/1.73M2 — SIGNIFICANT CHANGE UP
EOSINOPHIL # BLD AUTO: 0.21 K/UL — SIGNIFICANT CHANGE UP (ref 0–0.5)
EOSINOPHIL NFR BLD AUTO: 2.9 % — SIGNIFICANT CHANGE UP (ref 0–6)
ESTIMATED AVERAGE GLUCOSE: 120 MG/DL — HIGH (ref 68–114)
GLUCOSE SERPL-MCNC: 98 MG/DL — SIGNIFICANT CHANGE UP (ref 70–99)
HCT VFR BLD CALC: 45.2 % — HIGH (ref 34.5–45)
HGB BLD-MCNC: 14.8 G/DL — SIGNIFICANT CHANGE UP (ref 11.5–15.5)
IMM GRANULOCYTES # BLD AUTO: 0.02 K/UL — SIGNIFICANT CHANGE UP (ref 0–0.07)
IMM GRANULOCYTES NFR BLD AUTO: 0.3 % — SIGNIFICANT CHANGE UP (ref 0–0.9)
INR BLD: 0.96 RATIO — SIGNIFICANT CHANGE UP (ref 0.85–1.16)
LYMPHOCYTES # BLD AUTO: 2.14 K/UL — SIGNIFICANT CHANGE UP (ref 1–3.3)
LYMPHOCYTES NFR BLD AUTO: 29.3 % — SIGNIFICANT CHANGE UP (ref 13–44)
MCHC RBC-ENTMCNC: 28.1 PG — SIGNIFICANT CHANGE UP (ref 27–34)
MCHC RBC-ENTMCNC: 32.7 G/DL — SIGNIFICANT CHANGE UP (ref 32–36)
MCV RBC AUTO: 85.9 FL — SIGNIFICANT CHANGE UP (ref 80–100)
MONOCYTES # BLD AUTO: 0.66 K/UL — SIGNIFICANT CHANGE UP (ref 0–0.9)
MONOCYTES NFR BLD AUTO: 9 % — SIGNIFICANT CHANGE UP (ref 2–14)
MRSA PCR RESULT.: SIGNIFICANT CHANGE UP
NEUTROPHILS # BLD AUTO: 4.2 K/UL — SIGNIFICANT CHANGE UP (ref 1.8–7.4)
NEUTROPHILS NFR BLD AUTO: 57.4 % — SIGNIFICANT CHANGE UP (ref 43–77)
NRBC # BLD AUTO: 0 K/UL — SIGNIFICANT CHANGE UP (ref 0–0)
NRBC # FLD: 0 K/UL — SIGNIFICANT CHANGE UP (ref 0–0)
NRBC BLD AUTO-RTO: 0 /100 WBCS — SIGNIFICANT CHANGE UP (ref 0–0)
PLATELET # BLD AUTO: 352 K/UL — SIGNIFICANT CHANGE UP (ref 150–400)
PMV BLD: 9.7 FL — SIGNIFICANT CHANGE UP (ref 7–13)
POTASSIUM SERPL-MCNC: 4.5 MMOL/L — SIGNIFICANT CHANGE UP (ref 3.5–5.3)
POTASSIUM SERPL-SCNC: 4.5 MMOL/L — SIGNIFICANT CHANGE UP (ref 3.5–5.3)
PROTHROM AB SERPL-ACNC: 11.1 SEC — SIGNIFICANT CHANGE UP (ref 9.9–13.4)
RBC # BLD: 5.26 M/UL — HIGH (ref 3.8–5.2)
RBC # FLD: 14.1 % — SIGNIFICANT CHANGE UP (ref 10.3–14.5)
S AUREUS DNA NOSE QL NAA+PROBE: SIGNIFICANT CHANGE UP
SODIUM SERPL-SCNC: 141 MMOL/L — SIGNIFICANT CHANGE UP (ref 135–145)
WBC # BLD: 7.31 K/UL — SIGNIFICANT CHANGE UP (ref 3.8–10.5)
WBC # FLD AUTO: 7.31 K/UL — SIGNIFICANT CHANGE UP (ref 3.8–10.5)

## 2025-03-19 PROCEDURE — 87640 STAPH A DNA AMP PROBE: CPT

## 2025-03-19 PROCEDURE — 85730 THROMBOPLASTIN TIME PARTIAL: CPT

## 2025-03-19 PROCEDURE — 36415 COLL VENOUS BLD VENIPUNCTURE: CPT

## 2025-03-19 PROCEDURE — 85610 PROTHROMBIN TIME: CPT

## 2025-03-19 PROCEDURE — 85025 COMPLETE CBC W/AUTO DIFF WBC: CPT

## 2025-03-19 PROCEDURE — 87641 MR-STAPH DNA AMP PROBE: CPT

## 2025-03-19 PROCEDURE — 86850 RBC ANTIBODY SCREEN: CPT

## 2025-03-19 PROCEDURE — 86900 BLOOD TYPING SEROLOGIC ABO: CPT

## 2025-03-19 PROCEDURE — 86901 BLOOD TYPING SEROLOGIC RH(D): CPT

## 2025-03-19 PROCEDURE — 83036 HEMOGLOBIN GLYCOSYLATED A1C: CPT

## 2025-03-19 PROCEDURE — 80048 BASIC METABOLIC PNL TOTAL CA: CPT

## 2025-03-19 PROCEDURE — 93010 ELECTROCARDIOGRAM REPORT: CPT

## 2025-03-19 PROCEDURE — G0463: CPT

## 2025-03-19 PROCEDURE — 93005 ELECTROCARDIOGRAM TRACING: CPT

## 2025-03-19 NOTE — H&P PST ADULT - ASSESSMENT
Pt is a 69 years old female seen today pre-op for Right TONYA total knee replacement for unilateral primary. Pt reports longstanding knee pain now progressively worsening. Pt denies traumatic injury, denies fall. Pt reports pain is aggravated with walking, prolonged standing, rising from a seated position to stand. Pt rate pain as maximum 5/10 and at rest 2/10,  Pt5 reports prior conservative treatment of steroid injection, last cortisone injection 2024"? Pt currently take extra Tylenol for pain with mild relief . Pt medical hx includes GRED, HLD, HTN,  fading bruise and swelling anterior aspect of the left tibia regio( s/p hit her knee against an object), denies pain to site). Pt s/p left Robotic assisted TKR with TONYA 2022 by Dr. Delarosa . Pt scheduled for this surgery on 3/31/25 with Dr. Delarosa Surgery protocol reviewed with Pt today. Pt to follow up with cardiologist for evaluation and clearance prior to this surgery and cardiologist for instruction regarding ASA prior to this surgery   Patient instructed on NPO protocol   Patient instructed on Liquid protocol   Patient instructed to stop NSAID, all vitamins supplement, Fish oil, COQ 10,  herbals 5 days before this surgery.   Pt okay to take Tylenol as needed for pain   Patient will continue to take all his medications as prescribed    Patient instructed on infection prevention   Chlorhexidine scrub instructions provided  CAPRINI VTE 2.0 SCORE [CLOT updated 2019]    AGE RELATED RISK FACTORS                                                       MOBILITY RELATED FACTORS  [ ] Age 41-60 years                                            (1 Point)                    [ ] Bed rest                                                        (1 Point)  [ x] Age: 61-74 years                                           (2 Points)                  [ ] Plaster cast                                                   (2 Points)  [ ] Age= 75 years                                              (3 Points)                    [ ] Bed bound for more than 72 hours                 (2 Points)    DISEASE RELATED RISK FACTORS                                               GENDER SPECIFIC FACTORS  [ ] Edema in the lower extremities                       (1 Point)              [ ] Pregnancy                                                     (1 Point)  [ ] Varicose veins                                               (1 Point)                     [ ] Post-partum < 6 weeks                                   (1 Point)             [x ] BMI > 25 Kg/m2                                            (1 Point)                     [ ] Hormonal therapy  or oral contraception          (1 Point)                 [ ] Sepsis (in the previous month)                        (1 Point)               [ ] History of pregnancy complications                 (1 point)  [ ] Pneumonia or serious lung disease                                               [ ] Unexplained or recurrent                     (1 Point)           (in the previous month)                               (1 Point)  [ ] Abnormal pulmonary function test                     (1 Point)                 SURGERY RELATED RISK FACTORS  [ ] Acute myocardial infarction                              (1 Point)               [ ]  Section                                             (1 Point)  [ ] Congestive heart failure (in the previous month)  (1 Point)      [ ] Minor surgery                                                  (1 Point)   [ ] Inflammatory bowel disease                             (1 Point)               [ ] Arthroscopic surgery                                        (2 Points)  [ ] Central venous access                                      (2 Points)                [ ] General surgery lasting more than 45 minutes (2 points)  [ ] Malignancy- Present or previous                   (2 Points)                [x ] Elective arthroplasty                                         (5 points)    [ ] Stroke (in the previous month)                          (5 Points)                                                                                                                                                           HEMATOLOGY RELATED FACTORS                                                 TRAUMA RELATED RISK FACTORS  [ ] Prior episodes of VTE                                     (3 Points)                [ ] Fracture of the hip, pelvis, or leg                       (5 Points)  [ ] Positive family history for VTE                         (3 Points)             [ ] Acute spinal cord injury (in the previous month)  (5 Points)  [ ] Prothrombin 71355 A                                     (3 Points)               [ ] Paralysis  (less than 1 month)                             (5 Points)  [ ] Factor V Leiden                                             (3 Points)                  [ ] Multiple Trauma within 1 month                        (5 Points)  [ ] Lupus anticoagulants                                     (3 Points)                                                           [ ] Anticardiolipin antibodies                               (3 Points)                                                       [ ] High homocysteine in the blood                      (3 Points)                                             [ ] Other congenital or acquired thrombophilia      (3 Points)                                                [ ] Heparin induced thrombocytopenia                  (3 Points)                                     Total Score [      8    ]  OPIOID RISK TOOL    RUPINDER EACH BOX THAT APPLIES AND ADD TOTALS AT THE END    FAMILY HISTORY OF SUBSTANCE ABUSE                 FEMALE         MALE                                                Alcohol                             [  ]1 pt          [  ]3pts                                               Illegal Durgs                     [  ]2 pts        [  ]3pts                                               Rx Drugs                           [  ]4 pts        [  ]4 pts    PERSONAL HISTORY OF SUBSTANCE ABUSE                                                                                          Alcohol                             [  ]3 pts       [  ]3 pts                                               Illegal Drugs                     [  ]4 pts        [  ]4 pts                                               Rx Drugs                           [  ]5 pts        [  ]5 pts    AGE BETWEEN 16-45 YEARS                                      [  ]1 pt         [  ]1 pt    HISTORY OF PREADOLESCENT   SEXUAL ABUSE                                                             [  ]3 pts        [  ]0pts    PSYCHOLOGICAL DISEASE                     ADD, OCD, Bipolar, Schizophrenia        [  ]2 pts         [  ]2 pts                      Depression                                               [  ]1 pt           [  ]1 pt           SCORING TOTAL   (add numbers and type here)              (*0**)                                     A score of 3 or lower indicated LOW risk for future opioid abuse  A score of 4 to 7 indicated moderate risk for future opioid abuse  A score of 8 or higher indicates a high risk for opioid abuse

## 2025-03-19 NOTE — H&P PST ADULT - NSICDXPASTMEDICALHX_GEN_ALL_CORE_FT
(4) no limitation
PAST MEDICAL HISTORY:  Adrenal tumor     Chronic GERD     Gastritis     H/O hiatal hernia     HTN (hypertension)     Obesity     Unilateral primary osteoarthritis, right knee

## 2025-03-19 NOTE — H&P PST ADULT - HISTORY OF PRESENT ILLNESS
Pt is a 69 years old female seen today pre-op for Right TONYA total knee replacement     Pt s/p left Robotic assisted TKR with TONYA 9/28/2022.  Pt is a 69 years old female seen today pre-op for Right TONYA total knee replacement for unilateral primary. Pt reports longstanding knee pain now progressively worsening. Pt denies traumatic injury, denies fall. Pt reports pain is aggravated with walking, prolonged standing, rising from a seated position to stand. Pt rate pain as maximum 5/10 and at rest 2/10,  Pt5 reports prior conservative treatment of steroid injection, last cortisone injection October 2024"? Pt currently take extra Tylenol for pain with mild relief . Pt medical hx includes GRED, HLD, HLD     Pt s/p left Robotic assisted TKR with TONYA 9/28/2022 by Dr. Delarosa   Left knee  fading bruise( bump),  Pt is a 69 years old female seen today pre-op for Right TONYA total knee replacement for unilateral primary. Pt reports longstanding knee pain now progressively worsening. Pt denies traumatic injury, denies fall. Pt reports pain is aggravated with walking, prolonged standing, rising from a seated position to stand. Pt rate pain as maximum 5/10 and at rest 2/10,  Pt5 reports prior conservative treatment of steroid injection, last cortisone injection October 2024"? Pt currently take extra Tylenol for pain with mild relief . Pt medical hx includes GRED, HLD, HTN,  fading bruise and swelling anterior aspect of the left tibia regio( s/p hit her knee against an object), denies pain to site). Pt s/p left Robotic assisted TKR with TONYA 9/28/2022 by Dr. Delarosa . Pt scheduled for this surgery on 3/31/25 with Dr. Delarosa

## 2025-03-19 NOTE — H&P PST ADULT - NSICDXPASTSURGICALHX_GEN_ALL_CORE_FT
PAST SURGICAL HISTORY:  H/O breast biopsy     H/O excision of mass benign mass from forehead    History of adrenal surgery 2021    S/P anal fissurectomy     S/P cataract surgery     S/P cholecystectomy

## 2025-03-27 RX ORDER — POVIDONE-IODINE 7.5 %
1 SOLUTION, NON-ORAL TOPICAL ONCE
Refills: 0 | Status: COMPLETED | OUTPATIENT
Start: 2025-03-31 | End: 2025-03-31

## 2025-03-31 ENCOUNTER — INPATIENT (INPATIENT)
Facility: HOSPITAL | Age: 70
LOS: 0 days | Discharge: HOME | DRG: 470 | End: 2025-04-01
Attending: ORTHOPAEDIC SURGERY | Admitting: ORTHOPAEDIC SURGERY
Payer: COMMERCIAL

## 2025-03-31 ENCOUNTER — TRANSCRIPTION ENCOUNTER (OUTPATIENT)
Age: 70
End: 2025-03-31

## 2025-03-31 ENCOUNTER — APPOINTMENT (OUTPATIENT)
Dept: ORTHOPEDIC SURGERY | Facility: HOSPITAL | Age: 70
End: 2025-03-31

## 2025-03-31 VITALS
WEIGHT: 191.8 LBS | HEIGHT: 67 IN | RESPIRATION RATE: 20 BRPM | DIASTOLIC BLOOD PRESSURE: 100 MMHG | HEART RATE: 86 BPM | SYSTOLIC BLOOD PRESSURE: 137 MMHG | OXYGEN SATURATION: 98 % | TEMPERATURE: 98 F

## 2025-03-31 DIAGNOSIS — M17.11 UNILATERAL PRIMARY OSTEOARTHRITIS, RIGHT KNEE: ICD-10-CM

## 2025-03-31 DIAGNOSIS — Z90.49 ACQUIRED ABSENCE OF OTHER SPECIFIED PARTS OF DIGESTIVE TRACT: Chronic | ICD-10-CM

## 2025-03-31 DIAGNOSIS — Z98.49 CATARACT EXTRACTION STATUS, UNSPECIFIED EYE: Chronic | ICD-10-CM

## 2025-03-31 DIAGNOSIS — Z98.890 OTHER SPECIFIED POSTPROCEDURAL STATES: Chronic | ICD-10-CM

## 2025-03-31 PROCEDURE — 73560 X-RAY EXAM OF KNEE 1 OR 2: CPT | Mod: 26,RT

## 2025-03-31 PROCEDURE — 27447 TOTAL KNEE ARTHROPLASTY: CPT | Mod: RT

## 2025-03-31 PROCEDURE — 27447 TOTAL KNEE ARTHROPLASTY: CPT | Mod: AS,RT

## 2025-03-31 PROCEDURE — 0055T BONE SRGRY CMPTR CT/MRI IMAG: CPT | Mod: RT

## 2025-03-31 DEVICE — IMP PATELLA SYMMETRIC X3 29X8MM: Type: IMPLANTABLE DEVICE | Site: RIGHT | Status: FUNCTIONAL

## 2025-03-31 DEVICE — MAKO BONE PIN 4MM X 80MM: Type: IMPLANTABLE DEVICE | Site: RIGHT | Status: FUNCTIONAL

## 2025-03-31 DEVICE — BASEPLATE TIB UNIV TRIATHLON SZ 3: Type: IMPLANTABLE DEVICE | Site: RIGHT | Status: FUNCTIONAL

## 2025-03-31 DEVICE — MAKO BONE PIN 4MM X 140MM: Type: IMPLANTABLE DEVICE | Site: RIGHT | Status: FUNCTIONAL

## 2025-03-31 DEVICE — COMP FEM TRIATHLON CR SZ 3 RT: Type: IMPLANTABLE DEVICE | Site: RIGHT | Status: FUNCTIONAL

## 2025-03-31 DEVICE — INSERT TIB BEARING TRIATHLON CS X3 SZ 3 9MM: Type: IMPLANTABLE DEVICE | Site: RIGHT | Status: FUNCTIONAL

## 2025-03-31 DEVICE — ZIMMER FEMALE HEX SCREW MAGNETIC 2.5MM X 25MM: Type: IMPLANTABLE DEVICE | Site: RIGHT | Status: FUNCTIONAL

## 2025-03-31 DEVICE — CEMENT PALACOS R: Type: IMPLANTABLE DEVICE | Site: RIGHT | Status: FUNCTIONAL

## 2025-03-31 RX ORDER — ACETAMINOPHEN 500 MG/5ML
975 LIQUID (ML) ORAL ONCE
Refills: 0 | Status: COMPLETED | OUTPATIENT
Start: 2025-03-31 | End: 2025-03-31

## 2025-03-31 RX ORDER — ONDANSETRON HCL/PF 4 MG/2 ML
4 VIAL (ML) INJECTION EVERY 6 HOURS
Refills: 0 | Status: DISCONTINUED | OUTPATIENT
Start: 2025-03-31 | End: 2025-04-01

## 2025-03-31 RX ORDER — ACETAMINOPHEN 500 MG/5ML
1000 LIQUID (ML) ORAL ONCE
Refills: 0 | Status: COMPLETED | OUTPATIENT
Start: 2025-03-31 | End: 2025-04-01

## 2025-03-31 RX ORDER — CELECOXIB 50 MG/1
200 CAPSULE ORAL EVERY 12 HOURS
Refills: 0 | Status: DISCONTINUED | OUTPATIENT
Start: 2025-04-02 | End: 2025-04-01

## 2025-03-31 RX ORDER — ONDANSETRON HCL/PF 4 MG/2 ML
4 VIAL (ML) INJECTION ONCE
Refills: 0 | Status: DISCONTINUED | OUTPATIENT
Start: 2025-03-31 | End: 2025-03-31

## 2025-03-31 RX ORDER — APREPITANT 40 MG/1
40 CAPSULE ORAL ONCE
Refills: 0 | Status: COMPLETED | OUTPATIENT
Start: 2025-03-31 | End: 2025-03-31

## 2025-03-31 RX ORDER — TRANEXAMIC ACID 1000 MG/10
1000 AMPUL (ML) INTRAVENOUS ONCE
Refills: 0 | Status: DISCONTINUED | OUTPATIENT
Start: 2025-03-31 | End: 2025-03-31

## 2025-03-31 RX ORDER — OXYCODONE HYDROCHLORIDE 30 MG/1
10 TABLET ORAL
Refills: 0 | Status: DISCONTINUED | OUTPATIENT
Start: 2025-03-31 | End: 2025-04-01

## 2025-03-31 RX ORDER — OMEPRAZOLE 20 MG/1
1 CAPSULE, DELAYED RELEASE ORAL
Refills: 0 | DISCHARGE

## 2025-03-31 RX ORDER — ACETAMINOPHEN 500 MG/5ML
975 LIQUID (ML) ORAL EVERY 8 HOURS
Refills: 0 | Status: DISCONTINUED | OUTPATIENT
Start: 2025-03-31 | End: 2025-04-01

## 2025-03-31 RX ORDER — SODIUM CHLORIDE 9 G/1000ML
1000 INJECTION, SOLUTION INTRAVENOUS
Refills: 0 | Status: DISCONTINUED | OUTPATIENT
Start: 2025-03-31 | End: 2025-03-31

## 2025-03-31 RX ORDER — DILTIAZEM HYDROCHLORIDE 240 MG/1
120 TABLET, EXTENDED RELEASE ORAL DAILY
Refills: 0 | Status: DISCONTINUED | OUTPATIENT
Start: 2025-03-31 | End: 2025-04-01

## 2025-03-31 RX ORDER — FLUOCINOLONE ACETONIDE 0.025 %
1 CREAM (GRAM) TOPICAL
Refills: 0 | DISCHARGE

## 2025-03-31 RX ORDER — CEFAZOLIN SODIUM IN 0.9 % NACL 3 G/100 ML
2000 INTRAVENOUS SOLUTION, PIGGYBACK (ML) INTRAVENOUS ONCE
Refills: 0 | Status: DISCONTINUED | OUTPATIENT
Start: 2025-03-31 | End: 2025-03-31

## 2025-03-31 RX ORDER — CEFAZOLIN SODIUM IN 0.9 % NACL 3 G/100 ML
2000 INTRAVENOUS SOLUTION, PIGGYBACK (ML) INTRAVENOUS
Refills: 0 | Status: COMPLETED | OUTPATIENT
Start: 2025-03-31 | End: 2025-04-01

## 2025-03-31 RX ORDER — KETOROLAC TROMETHAMINE 30 MG/ML
15 INJECTION, SOLUTION INTRAMUSCULAR; INTRAVENOUS EVERY 6 HOURS
Refills: 0 | Status: COMPLETED | OUTPATIENT
Start: 2025-03-31 | End: 2025-04-01

## 2025-03-31 RX ORDER — ASPIRIN 325 MG
81 TABLET ORAL
Refills: 0 | Status: DISCONTINUED | OUTPATIENT
Start: 2025-04-01 | End: 2025-04-01

## 2025-03-31 RX ORDER — SENNA 187 MG
2 TABLET ORAL AT BEDTIME
Refills: 0 | Status: DISCONTINUED | OUTPATIENT
Start: 2025-03-31 | End: 2025-04-01

## 2025-03-31 RX ORDER — MAGNESIUM, ALUMINUM HYDROXIDE 200-200 MG
30 TABLET,CHEWABLE ORAL
Refills: 0 | Status: DISCONTINUED | OUTPATIENT
Start: 2025-03-31 | End: 2025-04-01

## 2025-03-31 RX ORDER — B1/B2/B3/B5/B6/B12/VIT C/FOLIC 500-0.5 MG
1 TABLET ORAL
Refills: 0 | DISCHARGE

## 2025-03-31 RX ORDER — POLYETHYLENE GLYCOL 3350 17 G/17G
17 POWDER, FOR SOLUTION ORAL AT BEDTIME
Refills: 0 | Status: DISCONTINUED | OUTPATIENT
Start: 2025-03-31 | End: 2025-04-01

## 2025-03-31 RX ORDER — FENTANYL CITRATE-0.9 % NACL/PF 100MCG/2ML
50 SYRINGE (ML) INTRAVENOUS
Refills: 0 | Status: DISCONTINUED | OUTPATIENT
Start: 2025-03-31 | End: 2025-03-31

## 2025-03-31 RX ORDER — HYDROMORPHONE/SOD CHLOR,ISO/PF 2 MG/10 ML
0.5 SYRINGE (ML) INJECTION
Refills: 0 | Status: DISCONTINUED | OUTPATIENT
Start: 2025-03-31 | End: 2025-03-31

## 2025-03-31 RX ORDER — OXYCODONE HYDROCHLORIDE 30 MG/1
5 TABLET ORAL
Refills: 0 | Status: DISCONTINUED | OUTPATIENT
Start: 2025-03-31 | End: 2025-04-01

## 2025-03-31 RX ORDER — HYDROMORPHONE/SOD CHLOR,ISO/PF 2 MG/10 ML
2 SYRINGE (ML) INJECTION
Refills: 0 | Status: DISCONTINUED | OUTPATIENT
Start: 2025-03-31 | End: 2025-04-01

## 2025-03-31 RX ORDER — MAGNESIUM HYDROXIDE 400 MG/5ML
30 SUSPENSION ORAL DAILY
Refills: 0 | Status: DISCONTINUED | OUTPATIENT
Start: 2025-03-31 | End: 2025-04-01

## 2025-03-31 RX ADMIN — KETOROLAC TROMETHAMINE 15 MILLIGRAM(S): 30 INJECTION, SOLUTION INTRAMUSCULAR; INTRAVENOUS at 23:00

## 2025-03-31 RX ADMIN — Medication 2000 MILLIGRAM(S): at 22:31

## 2025-03-31 RX ADMIN — Medication 975 MILLIGRAM(S): at 11:50

## 2025-03-31 RX ADMIN — KETOROLAC TROMETHAMINE 15 MILLIGRAM(S): 30 INJECTION, SOLUTION INTRAMUSCULAR; INTRAVENOUS at 22:30

## 2025-03-31 RX ADMIN — Medication 975 MILLIGRAM(S): at 23:31

## 2025-03-31 RX ADMIN — Medication 975 MILLIGRAM(S): at 22:31

## 2025-03-31 RX ADMIN — Medication 1 APPLICATION(S): at 11:49

## 2025-03-31 RX ADMIN — APREPITANT 40 MILLIGRAM(S): 40 CAPSULE ORAL at 11:50

## 2025-03-31 NOTE — DISCHARGE NOTE PROVIDER - HOSPITAL COURSE
The patient underwent a RIGHT TOTAL KNEE REPLACEMENT on 3/31. The patient received antibiotics consistent with SCIP guidelines. The patient underwent the procedure and had no intra-operative complications. Post-operatively, the patient was seen by medicine and PT. The patient received ASPIRIN for DVTP. The patient received pain medications per orthopedic pain management pathway and the pain was appropriately controlled. The patient did not have any post-operative medical complications. The patient was discharged in stable condition.

## 2025-03-31 NOTE — DISCHARGE NOTE PROVIDER - CARE PROVIDER_API CALL
Ian Delarosa  Joint Reconstruction  [address], [address] [place] [address]  Phone: [phone]  Fax: [phone]  Follow Up Time:

## 2025-03-31 NOTE — DISCHARGE NOTE PROVIDER - NSDCFUSCHEDAPPT_GEN_ALL_CORE_FT
Ian Delarosa  [facility]  ORTHOSURG [address]  Scheduled Appointment: 04/25/2025    [facility]  ORTHOSURG [address]  Scheduled Appointment: 05/14/2025

## 2025-03-31 NOTE — INPATIENT CERTIFICATION FOR MEDICARE PATIENTS - PHYSICIAN CONCUR
I concur with the Admission Order and I certify that services are provided in accordance with Section 42 CFR § 412.3 97.4

## 2025-03-31 NOTE — ASU PREOP CHECKLIST - SITE MARKED BY SURGEON
5/16/22 1:43 pm  Pt left text message on 5/15/22 for this nurse to return call regarding isolation. She wanted to know how long she needed to wear her mask. Educated that per [facility] guidelines, she should continue to wear mask for 10 full days even in home if others are present. Pt voiced understanding.
yes

## 2025-03-31 NOTE — DISCHARGE NOTE PROVIDER - NSDCFUADDINST_GEN_ALL_CORE_FT
The patient will be seen in the office between 2-3 weeks for wound check.   **Your first post-operative visit has been scheduled prior to your admission. PLEASE CONTACT OFFICE TO CONFIRM THE APPOINTMENT DATE.   **  The silver based dressing is to be removed 7 days from the date of surgery.   ** CONTACT THE OFFICE IF THE FOLLOWING DEVELOP:  - the dressing becomes soiled or saturated  - you develop a fever greater that 101F  - the wound becomes red or you develop blistering around the wound  * Patient may shower after post-op day #3.   * The patient will continue home PT consistent with  total knee replacement protocol. Transition to outpatient PT will occur at the time of the first office visit.   * The patient will practice knee extension exercises regularly to minimize hamstring contraction.   * The patient is FULL weight bearing.  * The patient will continue ASPIRIN for 6 weeks after surgery for blood clot prevention.  ** While on aspirin, the patient will take daily omeprazole or other similar medication to protect the stomach from irritation.   * The patient will take OXYCODONE for pain control and adjust according to prescription and patient needs. The patient will also take TYLENOL 975 mg every 8 hours for pain. Contact the office if pain increases while taking prescribed pain medications or related concerns develop.  * Celebrex will be taken twice daily for 3 weeks for pain control and prevention of excessive bone growth. Additional prescription may be requested at your office follow-up visit.   * The patient will take Senna S while taking oxycodone to prevent narcotic associated constipation.  Additionally, increase water intake (drink at least 8 glasses of water daily) and try adding fiber to the diet by eating fruits, vegetables and foods that are rich in grains. If constipation is experienced, contact the medical/primary care provider to discuss further treatment options.  * To avoid injury at home:  - continue use of rolling walker until cleared by physical therapist  - have family or friend remove all throw rug or objects in hallways that may present a trip hazard.  - if you experience any dizziness or medical concerns, call your medical doctor or 911.  * The implant may activate metal detection devices.

## 2025-03-31 NOTE — DISCHARGE NOTE PROVIDER - NSDCMRMEDTOKEN_GEN_ALL_CORE_FT
aspirin 81 mg oral delayed release tablet: 1 tab(s) orally once a day  dilTIAZem 120 mg/24 hours oral capsule, extended release: 1 cap(s) orally once a day  famotidine 20 mg oral tablet: 1 tab(s) orally once a day  fenofibrate 160 mg oral tablet: 1 tab(s) orally once a day  fluocinolone 0.01% topical cream: Apply topically to affected area  Multiple Vitamins oral tablet: 1 tab(s) orally  omeprazole 40 mg oral delayed release capsule: 1 cap(s) orally once a day  Probiotic Formula oral capsule: 1 cap(s) orally once a day  Vitamin D3 1000 intl units (25 mcg) oral tablet: 1 tab(s) orally once a day   Aspirin EC 81 mg oral delayed release tablet: 1 tab(s) orally 2 times a day MDD: 2  CeleBREX 200 mg oral capsule: 1 cap(s) orally 2 times a day MDD: 2  dilTIAZem 120 mg/24 hours oral capsule, extended release: 1 cap(s) orally once a day  famotidine 20 mg oral tablet: 1 tab(s) orally once a day  fenofibrate 160 mg oral tablet: 1 tab(s) orally once a day  fluocinolone 0.01% topical cream: Apply topically to affected area  Multiple Vitamins oral tablet: 1 tab(s) orally  Narcan 4 mg/0.1 mL nasal spray: 4 milligram(s) intranasally once a day use as directed  omeprazole 40 mg oral delayed release capsule: 1 cap(s) orally once a day  oxyCODONE 5 mg oral tablet: 1 tab(s) orally every 6 hours as needed for  moderate pain MDD: 4  Probiotic Formula oral capsule: 1 cap(s) orally once a day  Senna 8.6 mg oral tablet: 2 tab(s) orally once a day (at bedtime)  Vitamin D3 1000 intl units (25 mcg) oral tablet: 1 tab(s) orally once a day

## 2025-04-01 ENCOUNTER — TRANSCRIPTION ENCOUNTER (OUTPATIENT)
Age: 70
End: 2025-04-01

## 2025-04-01 VITALS
SYSTOLIC BLOOD PRESSURE: 123 MMHG | DIASTOLIC BLOOD PRESSURE: 78 MMHG | RESPIRATION RATE: 18 BRPM | HEART RATE: 74 BPM | OXYGEN SATURATION: 92 % | TEMPERATURE: 98 F

## 2025-04-01 PROCEDURE — C1713: CPT

## 2025-04-01 PROCEDURE — S2900: CPT

## 2025-04-01 PROCEDURE — 73560 X-RAY EXAM OF KNEE 1 OR 2: CPT

## 2025-04-01 PROCEDURE — 97110 THERAPEUTIC EXERCISES: CPT

## 2025-04-01 PROCEDURE — 97163 PT EVAL HIGH COMPLEX 45 MIN: CPT

## 2025-04-01 PROCEDURE — C1776: CPT

## 2025-04-01 PROCEDURE — 99232 SBSQ HOSP IP/OBS MODERATE 35: CPT | Mod: FS

## 2025-04-01 PROCEDURE — 27447 TOTAL KNEE ARTHROPLASTY: CPT

## 2025-04-01 PROCEDURE — 97116 GAIT TRAINING THERAPY: CPT

## 2025-04-01 RX ORDER — ASPIRIN 325 MG
1 TABLET ORAL
Qty: 84 | Refills: 0
Start: 2025-04-01 | End: 2025-05-12

## 2025-04-01 RX ORDER — CELECOXIB 50 MG/1
1 CAPSULE ORAL
Qty: 42 | Refills: 0
Start: 2025-04-01 | End: 2025-04-21

## 2025-04-01 RX ORDER — NALOXONE HYDROCHLORIDE 0.4 MG/ML
4 INJECTION, SOLUTION INTRAMUSCULAR; INTRAVENOUS; SUBCUTANEOUS
Qty: 1 | Refills: 0
Start: 2025-04-01

## 2025-04-01 RX ORDER — SENNA 187 MG
2 TABLET ORAL
Qty: 14 | Refills: 0
Start: 2025-04-01 | End: 2025-04-07

## 2025-04-01 RX ORDER — ASPIRIN 325 MG
1 TABLET ORAL
Refills: 0 | DISCHARGE

## 2025-04-01 RX ORDER — OXYCODONE HYDROCHLORIDE 30 MG/1
1 TABLET ORAL
Qty: 28 | Refills: 0
Start: 2025-04-01 | End: 2025-04-07

## 2025-04-01 RX ADMIN — KETOROLAC TROMETHAMINE 15 MILLIGRAM(S): 30 INJECTION, SOLUTION INTRAMUSCULAR; INTRAVENOUS at 05:04

## 2025-04-01 RX ADMIN — Medication 1000 MILLIGRAM(S): at 06:10

## 2025-04-01 RX ADMIN — OXYCODONE HYDROCHLORIDE 5 MILLIGRAM(S): 30 TABLET ORAL at 09:58

## 2025-04-01 RX ADMIN — Medication 2000 MILLIGRAM(S): at 05:04

## 2025-04-01 RX ADMIN — OXYCODONE HYDROCHLORIDE 5 MILLIGRAM(S): 30 TABLET ORAL at 06:43

## 2025-04-01 RX ADMIN — Medication 40 MILLIGRAM(S): at 05:04

## 2025-04-01 RX ADMIN — Medication 81 MILLIGRAM(S): at 05:04

## 2025-04-01 RX ADMIN — OXYCODONE HYDROCHLORIDE 5 MILLIGRAM(S): 30 TABLET ORAL at 10:50

## 2025-04-01 RX ADMIN — KETOROLAC TROMETHAMINE 15 MILLIGRAM(S): 30 INJECTION, SOLUTION INTRAMUSCULAR; INTRAVENOUS at 06:04

## 2025-04-01 RX ADMIN — Medication 400 MILLIGRAM(S): at 05:10

## 2025-04-01 NOTE — PROVIDER CONTACT NOTE (OTHER) - SITUATION
Attended joint education session on 3/11/2025 via online method  with opportunity to ask questions. Contact information for follow up questions given.

## 2025-04-01 NOTE — DISCHARGE NOTE NURSING/CASE MANAGEMENT/SOCIAL WORK - FINANCIAL ASSISTANCE
[facility] provides services at a reduced cost to those who are determined to be eligible through [facility]’s financial assistance program. Information regarding [facility]’s financial assistance program can be found by going to [email or web address] or by calling [phone].

## 2025-04-01 NOTE — CONSULT NOTE ADULT - NS ATTEND AMEND GEN_ALL_CORE FT
Patient is see and evaluated, chart reviewed in detail, agree with assessment and plan of the NP  patient's pain is well controlled, has no complains  CTA b/l   RIght knee Joint area is covered with clean dressing, no bleeding or soaking  Will continue with current plan of care  will d/c IV fluids.  Patient is stable from the medicine point of view for discharge pending PT and Ortho eval.

## 2025-04-01 NOTE — DISCHARGE NOTE NURSING/CASE MANAGEMENT/SOCIAL WORK - PATIENT PORTAL LINK FT
You can access the FollowMyHealth Patient Portal offered by [facility] by registering at the following website: [email or web address]. By joining [facility] [employer]’s FollowMyHealth portal, you will also be able to view your health information using other applications (apps) compatible with our system.

## 2025-04-01 NOTE — PATIENT PROFILE ADULT - FALL HARM RISK - HARM RISK INTERVENTIONS
Assistance with ambulation/Assistance OOB with selected safe patient handling equipment/Communicate Risk of Fall with Harm to all staff/Discuss with provider need for PT consult/Monitor gait and stability/Provide patient with walking aids - walker, cane, crutches/Reinforce activity limits and safety measures with patient and family/Sit up slowly, dangle for a short time, stand at bedside before walking/Tailored Fall Risk Interventions/Use of alarms - bed, chair and/or voice tab/Visual Cue: Yellow wristband and red socks/Bed in lowest position, wheels locked, appropriate side rails in place/Call bell, personal items and telephone in reach/Instruct patient to call for assistance before getting out of bed or chair/Non-slip footwear when patient is out of bed/[place] to call system/Physically safe environment - no spills, clutter or unnecessary equipment/Purposeful Proactive Rounding/Room/bathroom lighting operational, light cord in reach

## 2025-04-01 NOTE — CONSULT NOTE ADULT - CONSULT REQUESTED BY NAME
[facility] Appointment -Call back request    Provider: Dr Grullon  Appointment Type: OV  Reason for Visit: [employer] transportation called requesting call back to confirm pt's appt scheduled for tomorrow. They want to make sure they have correct information      Please advise.      Isaura

## 2025-04-01 NOTE — CONSULT NOTE ADULT - ASSESSMENT
Pt is a 69 years old female with Adrenal tumor, Chronic GERD, hiatal hernia, HTN, Obesity, Pt reports longstanding knee pain now progressively worsening. Pt denies traumatic injury, denies fall. Pt reports pain is aggravated with walking, prolonged standing, rising from a seated position to stand. Pt rate pain as maximum 5/10 and at rest 2/10,  Pt5 reports prior conservative treatment of steroid injection, last cortisone injection October 2024"? Pt currently take extra Tylenol for pain with mild relief .  Pt s/p left Robotic assisted TKR with TONYA 9/28/2022 by Dr. Delarosa . Now S/P right knee replacement  3/31/25 with Dr. Delarosa     Plan:     Right knee OA s/p TKR post op day 1:     - post op no complications  - VS stable  - abx per ortho   - c/w anti hypertensive to be restarted post op day 02 except if blood pressure goes >150 systolic   - c/w IVF x 24 hrs then reassess per ortho  - opiate induced constipation regimen   - encouraging incentive spirometry   -c/w local wound care per ortho   -DVT prophylaxis and Pain meds as per Ortho team   -PT/OT and weight bearing per ortho       HTN: will continue with dilTIAZem 120 mg once a day.     GERD: Will continue with famotidine 20 mg once a day and sucralfate 1 g 4 times a day (before meals and at bedtime), As Needed    HLD: Fenofibrate 160 mg once a day.     Hx of Adrenal tumor: s/p  Adrenalectomy 2021, adrenal adenoma. No longer on steroids Pt is a 69 years old female with Adrenal tumor, Chronic GERD, hiatal hernia, HTN, Obesity, Pt reports longstanding knee pain now progressively worsening. Pt denies traumatic injury, denies fall. Pt reports pain is aggravated with walking, prolonged standing, rising from a seated position to stand. Pt rate pain as maximum 5/10 and at rest 2/10,  Pt5 reports prior conservative treatment of steroid injection, last cortisone injection October 2024"? Pt currently take extra Tylenol for pain with mild relief .  Pt s/p left Robotic assisted TKR with TONYA 9/28/2022 by Dr. Delarosa . Now S/P right knee replacement  3/31/25 with Dr. Delarosa     Plan:     Right knee OA s/p TKR post op day 1:     - post op no complications  - VS stable  - abx per ortho   - c/w anti hypertensive to be restarted post op day 02 except if blood pressure goes >150 systolic   - c/w IVF x 24 hrs then reassess per ortho  - opiate induced constipation regimen   - encouraging incentive spirometry   -c/w local wound care per ortho   -DVT prophylaxis and Pain meds as per Ortho team   -PT/OT and weight bearing per ortho       HTN: will continue with dilTIAZem 120 mg once a day.     GERD: Will continue with famotidine 20 mg once a day and sucralfate 1 g 4 times a day (before meals and at bedtime), As Needed    HLD: Fenofibrate 160 mg once a day.     Hx of Adrenal tumor: s/p  Adrenalectomy 2021, adrenal adenoma. No longer on steroids. Follows with Endocrine. States she no longer requires stress steroids for surgical or dental procedures.     No medical contraindications to DC once cleared by Ortho and PT. Pt is a 69 years old female with Adrenal tumor, Chronic GERD, hiatal hernia, HTN, Obesity, Pt reports longstanding knee pain now progressively worsening. Pt denies traumatic injury, denies fall. Pt reports pain is aggravated with walking, prolonged standing, rising from a seated position to stand. Pt rate pain as maximum 5/10 and at rest 2/10,  Pt5 reports prior conservative treatment of steroid injection, last cortisone injection October 2024"? Pt currently take extra Tylenol for pain with mild relief .  Pt s/p left Robotic assisted TKR with TONYA 9/28/2022 by Dr. Delarosa . Now S/P right knee replacement  3/31/25 with Dr. Delarosa     Plan:     Right knee OA s/p TKR post op day 1:     - post op no complications  - VS stable  - abx per ortho   - opiate induced constipation regimen   - encouraging incentive spirometry   -c/w local wound care per ortho   -DVT prophylaxis and Pain meds as per Ortho team   -PT/OT and weight bearing per ortho       HTN: will continue with dilTIAZem 120 mg once a day.     GERD: Will continue with famotidine 20 mg once a day and sucralfate 1 g 4 times a day (before meals and at bedtime), As Needed    HLD: Fenofibrate 160 mg once a day.     Hx of Adrenal tumor: s/p  Adrenalectomy 2021, adrenal adenoma. No longer on steroids. Follows with Endocrine. States she no longer requires stress steroids for surgical or dental procedures.     No medical contraindications to DC once cleared by Ortho and PT.

## 2025-04-01 NOTE — PROGRESS NOTE ADULT - SUBJECTIVE AND OBJECTIVE BOX
ERNST RAE  [id number]    History: 69y Female is status post right total knee arthroplasty POD # 0. Patient is doing well and is comfortable. The patient's pain is controlled using the prescribed pain medications. T Denies CP, SOB, dizziness, HA, fever/chills, numbness or tingling. No new complaints.    Vital Signs Last 24 Hrs  T(C): 36.4 (31 Mar 2025 20:30), Max: 36.7 (31 Mar 2025 11:08)  T(F): 97.5 (31 Mar 2025 20:30), Max: 98.1 (31 Mar 2025 11:08)  HR: 80 (31 Mar 2025 21:00) (73 - 89)  BP: 121/65 (31 Mar 2025 21:00) (109/69 - 137/100)  BP(mean): 82 (31 Mar 2025 21:00) (79 - 94)  RR: 16 (31 Mar 2025 21:00) (12 - 20)  SpO2: 94% (31 Mar 2025 21:00) (93% - 100%)    Parameters below as of 31 Mar 2025 20:30  Patient On (Oxygen Delivery Method): room air    General: Alert, awake, NAD  Physical exam: RLE exam: The right knee dressing is clean, dry and intact. No drainage, discharge, erythema, blistering or ecchymosis noted.   The calf is supple nontender b/l.   SILT. +AT/GC/EHL/FHL on right   2+ DP pulse. BCR. No cyanosis.    Plan:   - DVT prophylaxis as prescribed, including use of compression devices and ankle pumps  - Continue physical therapy  - Weight bearing status of surgical extremity: WBAT RLE  - Incentive spirometry encouraged  - Pain control as clinically indicated  - abx per protocol
   ERNST RAE  [id number]    History: 69y Female is status post right total knee arthroplasty POD # 1. Patient is doing well and is comfortable. The patient's pain is controlled using the prescribed pain medications. The patient is participating in physical therapy. Denies CP, SOB, dizziness, HA, fever/chills, numbness or tingling. No new complaints.    Vital Signs Last 24 Hrs  T(C): 36.6 (01 Apr 2025 05:58), Max: 36.7 (31 Mar 2025 11:08)  T(F): 97.8 (01 Apr 2025 05:58), Max: 98.1 (31 Mar 2025 11:08)  HR: 78 (01 Apr 2025 05:58) (73 - 89)  BP: 112/68 (01 Apr 2025 05:58) (109/69 - 137/100)  BP(mean): 82 (31 Mar 2025 21:00) (79 - 94)  RR: 18 (01 Apr 2025 05:58) (12 - 20)  SpO2: 92% (01 Apr 2025 05:58) (92% - 100%)    Parameters below as of 01 Apr 2025 05:58  Patient On (Oxygen Delivery Method): room air                  General: Alert, awake, NAD  Physical exam: The right knee dressing is clean, dry and intact. No drainage, discharge, erythema, blistering or ecchymosis noted.   The calf is supple nontender b/l.   SILT. +AT/GC/EHL/FHL.   2+ DP pulse. BCR. No cyanosis.    Plan:   - DVT prophylaxis as prescribed, including use of compression devices and ankle pumps  - Continue physical therapy  - Weight bearing status of surgical extremity: WBAT  - Incentive spirometry encouraged  - Pain control as clinically indicated  - Discharge planning – anticipated discharge is Home

## 2025-04-01 NOTE — PHYSICAL THERAPY INITIAL EVALUATION ADULT - ADDITIONAL COMMENTS
Pt lives in a house with 6 steps to enter with  rails and  6 stairs inside with  rails.  Pt owns medical equipment: [employer]  Pt lives with: Alone

## 2025-04-07 ENCOUNTER — NON-APPOINTMENT (OUTPATIENT)
Age: 70
End: 2025-04-07

## 2025-04-07 RX ORDER — SENNOSIDES 8.6MG AND DOCUSATE SODIUM 50MG 8.6; 5 MG/1; MG/1
8.6-5 TABLET, FILM COATED ORAL
Qty: 28 | Refills: 0 | Status: ACTIVE | COMMUNITY
Start: 2025-04-07 | End: 1900-01-01

## 2025-04-25 ENCOUNTER — APPOINTMENT (OUTPATIENT)
Dept: ORTHOPEDIC SURGERY | Facility: CLINIC | Age: 70
End: 2025-04-25
Payer: MEDICARE

## 2025-04-25 VITALS — BODY MASS INDEX: 36.07 KG/M2 | WEIGHT: 196 LBS | HEIGHT: 62 IN

## 2025-04-25 DIAGNOSIS — Z96.651 AFTERCARE FOLLOWING JOINT REPLACEMENT SURGERY: ICD-10-CM

## 2025-04-25 DIAGNOSIS — Z96.651 PRESENCE OF RIGHT ARTIFICIAL KNEE JOINT: ICD-10-CM

## 2025-04-25 DIAGNOSIS — Z47.1 AFTERCARE FOLLOWING JOINT REPLACEMENT SURGERY: ICD-10-CM

## 2025-04-25 PROCEDURE — 99024 POSTOP FOLLOW-UP VISIT: CPT

## 2025-04-25 PROCEDURE — 73562 X-RAY EXAM OF KNEE 3: CPT | Mod: RT

## 2025-04-25 RX ORDER — OXYCODONE 5 MG/1
5 TABLET ORAL EVERY 6 HOURS
Qty: 28 | Refills: 0 | Status: ACTIVE | COMMUNITY
Start: 2025-04-25 | End: 1900-01-01

## 2025-05-03 ENCOUNTER — NON-APPOINTMENT (OUTPATIENT)
Age: 70
End: 2025-05-03

## 2025-05-06 ENCOUNTER — NON-APPOINTMENT (OUTPATIENT)
Age: 70
End: 2025-05-06

## 2025-05-14 ENCOUNTER — APPOINTMENT (OUTPATIENT)
Dept: ORTHOPEDIC SURGERY | Facility: CLINIC | Age: 70
End: 2025-05-14
Payer: MEDICARE

## 2025-05-14 VITALS — WEIGHT: 196 LBS | BODY MASS INDEX: 36.07 KG/M2 | HEIGHT: 62 IN

## 2025-05-14 DIAGNOSIS — Z96.651 PRESENCE OF RIGHT ARTIFICIAL KNEE JOINT: ICD-10-CM

## 2025-05-14 DIAGNOSIS — Z47.1 AFTERCARE FOLLOWING JOINT REPLACEMENT SURGERY: ICD-10-CM

## 2025-05-14 PROBLEM — M17.11 UNILATERAL PRIMARY OSTEOARTHRITIS, RIGHT KNEE: Chronic | Status: ACTIVE | Noted: 2025-03-19

## 2025-05-14 PROCEDURE — 73562 X-RAY EXAM OF KNEE 3: CPT | Mod: 26,RT

## 2025-05-14 PROCEDURE — 99024 POSTOP FOLLOW-UP VISIT: CPT

## 2025-05-14 RX ORDER — CELECOXIB 200 MG/1
200 CAPSULE ORAL
Qty: 60 | Refills: 0 | Status: ACTIVE | COMMUNITY
Start: 2025-05-14 | End: 1900-01-01

## 2025-06-10 ENCOUNTER — RX RENEWAL (OUTPATIENT)
Age: 70
End: 2025-06-10

## 2025-06-13 ENCOUNTER — APPOINTMENT (OUTPATIENT)
Dept: ORTHOPEDIC SURGERY | Facility: CLINIC | Age: 70
End: 2025-06-13
Payer: MEDICARE

## 2025-06-13 VITALS
DIASTOLIC BLOOD PRESSURE: 85 MMHG | BODY MASS INDEX: 34.96 KG/M2 | WEIGHT: 190 LBS | HEIGHT: 62 IN | SYSTOLIC BLOOD PRESSURE: 143 MMHG

## 2025-06-13 PROCEDURE — 99024 POSTOP FOLLOW-UP VISIT: CPT

## 2025-06-13 PROCEDURE — 73562 X-RAY EXAM OF KNEE 3: CPT | Mod: RT

## 2025-06-14 ENCOUNTER — NON-APPOINTMENT (OUTPATIENT)
Age: 70
End: 2025-06-14

## 2025-06-20 ENCOUNTER — APPOINTMENT (OUTPATIENT)
Dept: ORTHOPEDIC SURGERY | Facility: CLINIC | Age: 70
End: 2025-06-20

## 2025-07-18 ENCOUNTER — OUTPATIENT (OUTPATIENT)
Dept: OUTPATIENT SERVICES | Facility: HOSPITAL | Age: 70
LOS: 1 days | End: 2025-07-18
Payer: MEDICARE

## 2025-07-18 DIAGNOSIS — Z98.890 OTHER SPECIFIED POSTPROCEDURAL STATES: Chronic | ICD-10-CM

## 2025-07-18 DIAGNOSIS — R09.89 OTHER SPECIFIED SYMPTOMS AND SIGNS INVOLVING THE CIRCULATORY AND RESPIRATORY SYSTEMS: ICD-10-CM

## 2025-07-18 DIAGNOSIS — K21.00 GASTRO-ESOPHAGEAL REFLUX DISEASE WITH ESOPHAGITIS, WITHOUT BLEEDING: ICD-10-CM

## 2025-07-18 DIAGNOSIS — Z90.49 ACQUIRED ABSENCE OF OTHER SPECIFIED PARTS OF DIGESTIVE TRACT: Chronic | ICD-10-CM

## 2025-07-18 DIAGNOSIS — R09.A2 FOREIGN BODY SENSATION, THROAT: ICD-10-CM

## 2025-07-18 DIAGNOSIS — Z98.49 CATARACT EXTRACTION STATUS, UNSPECIFIED EYE: Chronic | ICD-10-CM

## 2025-07-18 DIAGNOSIS — K44.9 DIAPHRAGMATIC HERNIA WITHOUT OBSTRUCTION OR GANGRENE: ICD-10-CM

## 2025-07-18 PROCEDURE — 74220 X-RAY XM ESOPHAGUS 1CNTRST: CPT | Mod: 26

## 2025-07-18 PROCEDURE — 74220 X-RAY XM ESOPHAGUS 1CNTRST: CPT

## 2025-08-20 RX ORDER — AMOXICILLIN 500 MG/1
500 TABLET, FILM COATED ORAL
Qty: 4 | Refills: 2 | Status: ACTIVE | COMMUNITY
Start: 2025-08-20 | End: 1900-01-01

## 2025-08-28 ENCOUNTER — NON-APPOINTMENT (OUTPATIENT)
Age: 70
End: 2025-08-28

## (undated) DEVICE — DRAPE XL SHEET 77X98"

## (undated) DEVICE — KT PULSAVAC WOUND W/ SPRAYTIP

## (undated) DEVICE — MAKO VIZADISC KNEE TRACKING KIT

## (undated) DEVICE — ZIMMER COMPACT VACUUM CEMENT MIXING SYSTEM

## (undated) DEVICE — SUT VICRYL PLUS 0 18" CT-1 UNDYED (POP-OFF)

## (undated) DEVICE — NDL HYPO SAFE 20G X 1.5"

## (undated) DEVICE — MAKO STRYKER SILICONE RETRACTOR CORD

## (undated) DEVICE — POSITIONER STRYKER LEG

## (undated) DEVICE — KIT CHECKPOINT FEM TIB STERILE

## (undated) DEVICE — SOL BETADINE POUCH 0.75OZ STERILE

## (undated) DEVICE — SYR LUER LOK 30CC

## (undated) DEVICE — SUT MONOCRYL 3-0 27" PS-2 UNDYED

## (undated) DEVICE — BLADE SCALPEL SAFETYLOCK #15

## (undated) DEVICE — SUT VICRYL 2-0 27" CT-2 UNDYED

## (undated) DEVICE — SOL IRR POUR NS 0.9% 1000ML

## (undated) DEVICE — PACK TOTAL KNEE

## (undated) DEVICE — NDL HYPO SAFE 22G X 1.5"

## (undated) DEVICE — DRAPE IOBAN 33" X 23"

## (undated) DEVICE — SOL INJ NS 0.9% 100ML

## (undated) DEVICE — WRAP COMPRESSION CALF MED

## (undated) DEVICE — MAKO CHECKPOINT KIT FEMORAL / TIBIAL

## (undated) DEVICE — WOUND IRR SURGIPHOR

## (undated) DEVICE — SUT VICRYL 0 18" CT-1 UNDYED

## (undated) DEVICE — DRAPE SHEET XL 77X98"

## (undated) DEVICE — DRAPE 3/4 SHEET 52X76"

## (undated) DEVICE — MAKO DRAPE KIT

## (undated) DEVICE — DRAPE STICKY U BLUE 60 X 84"

## (undated) DEVICE — XI OBTURATOR OPTICAL BLADELESS 8MM

## (undated) DEVICE — DRAPE HALF SHEET 40X57"

## (undated) DEVICE — GLV COTTON LG STERILE

## (undated) DEVICE — SYSTEM CEMENT MIXER COMPACT

## (undated) DEVICE — WARMING BLANKET UPPER ADULT

## (undated) DEVICE — SLING ARM CHIEFTAIN MESH LARGE

## (undated) DEVICE — ZIMMER PULSAVAC PLUS FAN KIT

## (undated) DEVICE — SOL IRR POUR H2O 1000ML

## (undated) DEVICE — TAPE SILK 3"

## (undated) DEVICE — HOOD T7 NON-PEELAWAY

## (undated) DEVICE — SYR LUER LOK 10CC

## (undated) DEVICE — ELCTR STRYKER NEPTUNE SMOKE EVACUATION PENCIL (GREEN)

## (undated) DEVICE — SOL IRR BAG NS 0.9% 3000ML

## (undated) DEVICE — DRAPE 1/2 SHEET 40X57"

## (undated) DEVICE — GLV 8.5 PROTEXIS (WHITE)

## (undated) DEVICE — SPECIMEN CONTAINER 4OZ

## (undated) DEVICE — NDL HYPO SAFE 18G X 1.5"

## (undated) DEVICE — BLADE SAFETY LOCK #15

## (undated) DEVICE — POSITIONER LEG WRAP STERILE

## (undated) DEVICE — NDL HYPO SAFE 18G X 1.5" (PINK)

## (undated) DEVICE — DRSG MEPILEX 10 X 25CM (4 X 10") POST OP AG SILVER

## (undated) DEVICE — GLV 8 DURAPRENE

## (undated) DEVICE — HOOD FLYTE STRYKER SURGICOOL W PEELAWAY

## (undated) DEVICE — DRAPE SPLIT SHEETS 77X108"

## (undated) DEVICE — VENODYNE/SCD SLEEVE CALF MEDIUM

## (undated) DEVICE — MAKO BLADE STANDARD

## (undated) DEVICE — URETERAL CATH RED RUBBER 16FR (ORANGE)

## (undated) DEVICE — DRSG IMMOBILIZER SHOULDER QUICK RELEASE LG

## (undated) DEVICE — FRAZIER SUCTION TIP 10FR

## (undated) DEVICE — DRSG DERMABOND 0.7ML

## (undated) DEVICE — NDL HYPO SAFE 20G X 1.5" (YELLOW)

## (undated) DEVICE — SUT DERMABOND 0.7ML

## (undated) DEVICE — MAKO BLADE NARROW

## (undated) DEVICE — BLANKET WARMER UPPER ADULT

## (undated) DEVICE — GLV 8 PROTEXIS

## (undated) DEVICE — ELCTR GROUNDING PAD ADULT COVIDIEN